# Patient Record
Sex: FEMALE | Race: WHITE
[De-identification: names, ages, dates, MRNs, and addresses within clinical notes are randomized per-mention and may not be internally consistent; named-entity substitution may affect disease eponyms.]

---

## 2017-12-27 ENCOUNTER — HOSPITAL ENCOUNTER (OUTPATIENT)
Age: 57
End: 2017-12-27
Payer: COMMERCIAL

## 2017-12-27 DIAGNOSIS — G56.01: Primary | ICD-10-CM

## 2017-12-27 PROCEDURE — 81025 URINE PREGNANCY TEST: CPT

## 2017-12-27 PROCEDURE — 96375 TX/PRO/DX INJ NEW DRUG ADDON: CPT

## 2017-12-27 PROCEDURE — 64721 CARPAL TUNNEL SURGERY: CPT

## 2018-01-22 ENCOUNTER — HOSPITAL ENCOUNTER (OUTPATIENT)
Age: 58
End: 2018-01-22
Payer: COMMERCIAL

## 2018-01-22 VITALS — BODY MASS INDEX: 39.2 KG/M2

## 2018-01-22 VITALS
HEART RATE: 95 BPM | RESPIRATION RATE: 18 BRPM | DIASTOLIC BLOOD PRESSURE: 76 MMHG | SYSTOLIC BLOOD PRESSURE: 139 MMHG | OXYGEN SATURATION: 96 % | TEMPERATURE: 97.2 F

## 2018-01-22 DIAGNOSIS — M51.16: Primary | ICD-10-CM

## 2018-01-22 PROCEDURE — 99212 OFFICE O/P EST SF 10 MIN: CPT

## 2018-01-22 PROCEDURE — 71046 X-RAY EXAM CHEST 2 VIEWS: CPT

## 2018-02-02 ENCOUNTER — HOSPITAL ENCOUNTER (OUTPATIENT)
Age: 58
End: 2018-02-02
Payer: COMMERCIAL

## 2018-02-02 DIAGNOSIS — R53.82: Primary | ICD-10-CM

## 2018-02-02 DIAGNOSIS — R59.0: ICD-10-CM

## 2018-02-02 LAB
ALBUMIN LEVEL: 3.7 GM/DL (ref 3.4–5)
ALBUMIN/GLOB SERPL: 1 {RATIO} (ref 1.1–1.8)
ALP ISO SERPL-ACNC: 112 U/L (ref 46–116)
ALT SERPLBLD-CCNC: 48 U/L (ref 12–78)
ANION GAP SERPL CALC-SCNC: 12.4 MEQ/L (ref 5–15)
AST SERPL QL: 20 U/L (ref 15–37)
BILIRUBIN,TOTAL: 0.2 MG/DL (ref 0.2–1)
BUN SERPL-MCNC: 10 MG/DL (ref 7–18)
CALCIUM SPEC-MCNC: 9.2 MG/DL (ref 8.5–10.1)
CHLORIDE SPEC-SCNC: 100 MMOL/L (ref 98–107)
CO2 SERPL-SCNC: 30 MMOL/L (ref 21–32)
CREAT BLD-SCNC: 0.6 MG/DL (ref 0.55–1.02)
ESTIMATED GLOMERULAR FILT RATE: 104 ML/MIN (ref 60–?)
GFR (AFRICAN AMERICAN): 126 ML/MIN (ref 60–?)
GLOBULIN SER CALC-MCNC: 3.7 GM/DL (ref 1.3–3.2)
GLUCOSE: 114 MG/DL (ref 74–106)
HCT VFR BLD CALC: 38.6 % (ref 37–47)
HGB BLD-MCNC: 12.3 G/DL (ref 12.2–16.2)
MCHC RBC-ENTMCNC: 31.9 G/DL (ref 31.8–35.4)
MCV RBC: 87.7 FL (ref 81–99)
MEAN CORPUSCULAR HEMOGLOBIN: 28 PG (ref 27–31.2)
PLATELET # BLD: 391 K/MM3 (ref 142–424)
POTASSIUM: 4.4 MMOL/L (ref 3.5–5.1)
PROT SERPL-MCNC: 7.4 GM/DL (ref 6.4–8.2)
RBC # BLD AUTO: 4.4 M/MM3 (ref 4.2–5.4)
SODIUM SPEC-SCNC: 138 MMOL/L (ref 136–145)
T3RU NFR SERPL: 36 % (ref 31–39)
T4 FREE SERPL-MCNC: 1.1 NG/DL (ref 0.76–1.46)
TSH SERPL-ACNC: 0.81 UIU/ML (ref 0.36–3.74)
WBC # BLD AUTO: 9.9 K/MM3 (ref 4.8–10.8)

## 2018-02-02 PROCEDURE — 85025 COMPLETE CBC W/AUTO DIFF WBC: CPT

## 2018-02-02 PROCEDURE — 71046 X-RAY EXAM CHEST 2 VIEWS: CPT

## 2018-02-02 PROCEDURE — 36415 COLL VENOUS BLD VENIPUNCTURE: CPT

## 2018-02-02 PROCEDURE — 84479 ASSAY OF THYROID (T3 OR T4): CPT

## 2018-02-02 PROCEDURE — 84439 ASSAY OF FREE THYROXINE: CPT

## 2018-02-02 PROCEDURE — 84443 ASSAY THYROID STIM HORMONE: CPT

## 2018-02-02 PROCEDURE — 80053 COMPREHEN METABOLIC PANEL: CPT

## 2018-02-12 ENCOUNTER — HOSPITAL ENCOUNTER (OUTPATIENT)
Age: 58
End: 2018-02-12
Payer: COMMERCIAL

## 2018-02-12 DIAGNOSIS — R53.82: ICD-10-CM

## 2018-02-12 DIAGNOSIS — R59.9: ICD-10-CM

## 2018-02-12 DIAGNOSIS — R59.0: Primary | ICD-10-CM

## 2018-02-12 LAB
BUN SERPL-MCNC: 12 MG/DL (ref 7–18)
CREAT BLD-SCNC: 0.57 MG/DL (ref 0.55–1.02)
ESTIMATED GLOMERULAR FILT RATE: 110 ML/MIN (ref 60–?)
GFR (AFRICAN AMERICAN): 133 ML/MIN (ref 60–?)

## 2018-02-12 PROCEDURE — 82565 ASSAY OF CREATININE: CPT

## 2018-02-12 PROCEDURE — 84520 ASSAY OF UREA NITROGEN: CPT

## 2018-02-12 PROCEDURE — 36415 COLL VENOUS BLD VENIPUNCTURE: CPT

## 2018-02-15 ENCOUNTER — HOSPITAL ENCOUNTER (OUTPATIENT)
Age: 58
End: 2018-02-15
Payer: COMMERCIAL

## 2018-02-15 VITALS — HEART RATE: 87 BPM

## 2018-02-15 DIAGNOSIS — R06.02: Primary | ICD-10-CM

## 2018-02-15 PROCEDURE — 94726 PLETHYSMOGRAPHY LUNG VOLUMES: CPT

## 2018-02-15 PROCEDURE — 94729 DIFFUSING CAPACITY: CPT

## 2018-02-15 PROCEDURE — 94640 AIRWAY INHALATION TREATMENT: CPT

## 2018-02-15 PROCEDURE — 94060 EVALUATION OF WHEEZING: CPT

## 2018-02-20 ENCOUNTER — HOSPITAL ENCOUNTER (OUTPATIENT)
Age: 58
End: 2018-02-20
Payer: COMMERCIAL

## 2018-02-20 DIAGNOSIS — R59.9: ICD-10-CM

## 2018-02-20 DIAGNOSIS — R59.0: ICD-10-CM

## 2018-02-20 DIAGNOSIS — R53.82: Primary | ICD-10-CM

## 2018-02-20 PROCEDURE — 70491 CT SOFT TISSUE NECK W/DYE: CPT

## 2018-02-23 ENCOUNTER — HOSPITAL ENCOUNTER (OUTPATIENT)
Age: 58
End: 2018-02-23
Payer: COMMERCIAL

## 2018-02-23 VITALS — HEART RATE: 82 BPM | DIASTOLIC BLOOD PRESSURE: 79 MMHG | SYSTOLIC BLOOD PRESSURE: 151 MMHG | RESPIRATION RATE: 18 BRPM

## 2018-02-23 VITALS
RESPIRATION RATE: 20 BRPM | HEART RATE: 83 BPM | DIASTOLIC BLOOD PRESSURE: 83 MMHG | OXYGEN SATURATION: 98 % | SYSTOLIC BLOOD PRESSURE: 125 MMHG | TEMPERATURE: 98.5 F

## 2018-02-23 VITALS
SYSTOLIC BLOOD PRESSURE: 150 MMHG | OXYGEN SATURATION: 99 % | DIASTOLIC BLOOD PRESSURE: 81 MMHG | HEART RATE: 81 BPM | RESPIRATION RATE: 18 BRPM

## 2018-02-23 VITALS
OXYGEN SATURATION: 96 % | RESPIRATION RATE: 20 BRPM | TEMPERATURE: 98.42 F | SYSTOLIC BLOOD PRESSURE: 127 MMHG | HEART RATE: 77 BPM | DIASTOLIC BLOOD PRESSURE: 72 MMHG

## 2018-02-23 VITALS — BODY MASS INDEX: 39.2 KG/M2

## 2018-02-23 DIAGNOSIS — M51.16: Primary | ICD-10-CM

## 2018-02-23 DIAGNOSIS — M47.896: ICD-10-CM

## 2018-02-23 PROCEDURE — 62323 NJX INTERLAMINAR LMBR/SAC: CPT

## 2018-03-01 ENCOUNTER — HOSPITAL ENCOUNTER (OUTPATIENT)
Dept: HOSPITAL 22 - LAB | Age: 58
End: 2018-03-01
Payer: COMMERCIAL

## 2018-03-01 DIAGNOSIS — Z79.899: Primary | ICD-10-CM

## 2018-03-01 PROCEDURE — 80305 DRUG TEST PRSMV DIR OPT OBS: CPT

## 2018-03-13 ENCOUNTER — HOSPITAL ENCOUNTER (OUTPATIENT)
Age: 58
End: 2018-03-13
Payer: COMMERCIAL

## 2018-03-13 VITALS
OXYGEN SATURATION: 98 % | SYSTOLIC BLOOD PRESSURE: 115 MMHG | RESPIRATION RATE: 22 BRPM | DIASTOLIC BLOOD PRESSURE: 65 MMHG | HEART RATE: 101 BPM

## 2018-03-13 VITALS — BODY MASS INDEX: 39.6 KG/M2

## 2018-03-13 DIAGNOSIS — M47.816: ICD-10-CM

## 2018-03-13 DIAGNOSIS — M48.061: Primary | ICD-10-CM

## 2018-03-13 PROCEDURE — 99212 OFFICE O/P EST SF 10 MIN: CPT

## 2018-03-27 ENCOUNTER — HOSPITAL ENCOUNTER (OUTPATIENT)
Age: 58
End: 2018-03-27
Payer: COMMERCIAL

## 2018-03-27 DIAGNOSIS — R59.9: Primary | ICD-10-CM

## 2018-03-27 LAB
BUN SERPL-MCNC: 9 MG/DL (ref 7–18)
CREAT BLD-SCNC: 0.63 MG/DL (ref 0.55–1.02)
ESTIMATED GLOMERULAR FILT RATE: 98 ML/MIN (ref 60–?)
GFR (AFRICAN AMERICAN): 119 ML/MIN (ref 60–?)

## 2018-03-27 PROCEDURE — 36415 COLL VENOUS BLD VENIPUNCTURE: CPT

## 2018-03-27 PROCEDURE — 82565 ASSAY OF CREATININE: CPT

## 2018-03-27 PROCEDURE — 84520 ASSAY OF UREA NITROGEN: CPT

## 2018-03-29 ENCOUNTER — HOSPITAL ENCOUNTER (OUTPATIENT)
Age: 58
End: 2018-03-29
Payer: COMMERCIAL

## 2018-03-29 DIAGNOSIS — R59.0: Primary | ICD-10-CM

## 2018-03-29 DIAGNOSIS — L03.323: ICD-10-CM

## 2018-03-29 PROCEDURE — 71260 CT THORAX DX C+: CPT

## 2018-04-16 ENCOUNTER — HOSPITAL ENCOUNTER (OUTPATIENT)
Age: 58
End: 2018-04-16
Payer: COMMERCIAL

## 2018-04-16 VITALS — DIASTOLIC BLOOD PRESSURE: 66 MMHG | RESPIRATION RATE: 18 BRPM | HEART RATE: 76 BPM | SYSTOLIC BLOOD PRESSURE: 142 MMHG

## 2018-04-16 VITALS — BODY MASS INDEX: 39.2 KG/M2

## 2018-04-16 DIAGNOSIS — M47.816: Primary | ICD-10-CM

## 2018-04-16 PROCEDURE — 99212 OFFICE O/P EST SF 10 MIN: CPT

## 2018-05-07 ENCOUNTER — HOSPITAL ENCOUNTER (OUTPATIENT)
Age: 58
End: 2018-05-07
Payer: COMMERCIAL

## 2018-05-07 VITALS
HEART RATE: 74 BPM | TEMPERATURE: 98.06 F | RESPIRATION RATE: 18 BRPM | OXYGEN SATURATION: 99 % | SYSTOLIC BLOOD PRESSURE: 136 MMHG | DIASTOLIC BLOOD PRESSURE: 39 MMHG

## 2018-05-07 VITALS — BODY MASS INDEX: 39.2 KG/M2

## 2018-05-07 DIAGNOSIS — M79.1: ICD-10-CM

## 2018-05-07 DIAGNOSIS — M48.00: Primary | ICD-10-CM

## 2018-05-07 PROCEDURE — 99212 OFFICE O/P EST SF 10 MIN: CPT

## 2018-05-08 ENCOUNTER — HOSPITAL ENCOUNTER (OUTPATIENT)
Dept: HOSPITAL 22 - LAB | Age: 58
End: 2018-05-08
Payer: COMMERCIAL

## 2018-05-08 DIAGNOSIS — R53.83: Primary | ICD-10-CM

## 2018-05-08 DIAGNOSIS — R51: ICD-10-CM

## 2018-05-08 LAB
ALBUMIN LEVEL: 3.8 GM/DL (ref 3.4–5)
ALBUMIN/GLOB SERPL: 1 {RATIO} (ref 1.1–1.8)
ALP ISO SERPL-ACNC: 113 U/L (ref 46–116)
ALT SERPLBLD-CCNC: 70 U/L (ref 12–78)
ANION GAP SERPL CALC-SCNC: 14.6 MEQ/L (ref 5–15)
AST SERPL QL: 49 U/L (ref 15–37)
BILIRUBIN,TOTAL: 0.2 MG/DL (ref 0.2–1)
BUN SERPL-MCNC: 14 MG/DL (ref 7–18)
CALCIUM SPEC-MCNC: 9.6 MG/DL (ref 8.5–10.1)
CHLORIDE SPEC-SCNC: 104 MMOL/L (ref 98–107)
CHOLEST SPEC-SCNC: 202 MG/DL (ref 140–200)
CO2 SERPL-SCNC: 27 MMOL/L (ref 21–32)
CREAT BLD-SCNC: 0.7 MG/DL (ref 0.55–1.02)
ESTIMATED GLOMERULAR FILT RATE: 87 ML/MIN (ref 60–?)
GFR (AFRICAN AMERICAN): 105 ML/MIN (ref 60–?)
GLOBULIN SER CALC-MCNC: 3.8 GM/DL (ref 1.3–3.2)
GLUCOSE: 88 MG/DL (ref 74–106)
HBA1C MFR BLD: 6 % (ref 0–7)
HCT VFR BLD CALC: 41 % (ref 37–47)
HDLC SERPL-MCNC: 59 MG/DL (ref 29–89)
HGB BLD-MCNC: 13.3 G/DL (ref 12.2–16.2)
MCHC RBC-ENTMCNC: 32.4 G/DL (ref 31.8–35.4)
MCV RBC: 89.4 FL (ref 81–99)
MEAN CORPUSCULAR HEMOGLOBIN: 29 PG (ref 27–31.2)
PLATELET # BLD: 390 K/MM3 (ref 142–424)
POTASSIUM: 4.6 MMOL/L (ref 3.5–5.1)
PROT SERPL-MCNC: 7.6 GM/DL (ref 6.4–8.2)
RBC # BLD AUTO: 4.59 M/MM3 (ref 4.2–5.4)
SODIUM SPEC-SCNC: 141 MMOL/L (ref 136–145)
T4 FREE SERPL-MCNC: 1.14 NG/DL (ref 0.76–1.46)
TRIGLYCERIDES: 269 MG/DL (ref 30–200)
TSH SERPL-ACNC: 1.16 UIU/ML (ref 0.36–3.74)
WBC # BLD AUTO: 8.3 K/MM3 (ref 4.8–10.8)

## 2018-05-08 PROCEDURE — 84439 ASSAY OF FREE THYROXINE: CPT

## 2018-05-08 PROCEDURE — 80061 LIPID PANEL: CPT

## 2018-05-08 PROCEDURE — 85025 COMPLETE CBC W/AUTO DIFF WBC: CPT

## 2018-05-08 PROCEDURE — 82652 VIT D 1 25-DIHYDROXY: CPT

## 2018-05-08 PROCEDURE — 80053 COMPREHEN METABOLIC PANEL: CPT

## 2018-05-08 PROCEDURE — 84443 ASSAY THYROID STIM HORMONE: CPT

## 2018-05-08 PROCEDURE — 83036 HEMOGLOBIN GLYCOSYLATED A1C: CPT

## 2018-06-15 ENCOUNTER — HOSPITAL ENCOUNTER (OUTPATIENT)
Age: 58
End: 2018-06-15
Payer: COMMERCIAL

## 2018-06-15 DIAGNOSIS — N89.8: Primary | ICD-10-CM

## 2018-06-15 PROCEDURE — 87086 URINE CULTURE/COLONY COUNT: CPT

## 2018-06-25 ENCOUNTER — HOSPITAL ENCOUNTER (OUTPATIENT)
Age: 58
End: 2018-06-25
Payer: COMMERCIAL

## 2018-06-25 VITALS
RESPIRATION RATE: 18 BRPM | OXYGEN SATURATION: 94 % | SYSTOLIC BLOOD PRESSURE: 128 MMHG | TEMPERATURE: 97.8 F | HEART RATE: 91 BPM | DIASTOLIC BLOOD PRESSURE: 79 MMHG

## 2018-06-25 VITALS — BODY MASS INDEX: 38.2 KG/M2

## 2018-06-25 DIAGNOSIS — M54.2: ICD-10-CM

## 2018-06-25 DIAGNOSIS — M79.1: Primary | ICD-10-CM

## 2018-06-25 PROCEDURE — 99212 OFFICE O/P EST SF 10 MIN: CPT

## 2018-07-06 ENCOUNTER — HOSPITAL ENCOUNTER (OUTPATIENT)
Dept: HOSPITAL 22 - PT | Age: 58
Discharge: HOME | End: 2018-07-06
Payer: COMMERCIAL

## 2018-07-06 DIAGNOSIS — M54.2: Primary | ICD-10-CM

## 2018-07-06 PROCEDURE — 97163 PT EVAL HIGH COMPLEX 45 MIN: CPT

## 2018-07-06 PROCEDURE — G0283 ELEC STIM OTHER THAN WOUND: HCPCS

## 2018-07-06 PROCEDURE — 97110 THERAPEUTIC EXERCISES: CPT

## 2018-07-06 PROCEDURE — 97035 APP MDLTY 1+ULTRASOUND EA 15: CPT

## 2018-07-06 PROCEDURE — 97010 HOT OR COLD PACKS THERAPY: CPT

## 2018-07-06 PROCEDURE — 97014 ELECTRIC STIMULATION THERAPY: CPT

## 2018-07-06 PROCEDURE — 97140 MANUAL THERAPY 1/> REGIONS: CPT

## 2018-07-09 ENCOUNTER — HOSPITAL ENCOUNTER (OUTPATIENT)
Age: 58
End: 2018-07-09
Payer: COMMERCIAL

## 2018-07-09 VITALS — HEART RATE: 85 BPM

## 2018-07-09 VITALS
SYSTOLIC BLOOD PRESSURE: 145 MMHG | DIASTOLIC BLOOD PRESSURE: 80 MMHG | HEART RATE: 108 BPM | OXYGEN SATURATION: 96 % | RESPIRATION RATE: 18 BRPM

## 2018-07-09 DIAGNOSIS — F17.200: ICD-10-CM

## 2018-07-09 DIAGNOSIS — R93.8: Primary | ICD-10-CM

## 2018-07-09 DIAGNOSIS — Z87.891: ICD-10-CM

## 2018-07-09 PROCEDURE — 94640 AIRWAY INHALATION TREATMENT: CPT

## 2018-07-09 PROCEDURE — 94726 PLETHYSMOGRAPHY LUNG VOLUMES: CPT

## 2018-07-09 PROCEDURE — 71250 CT THORAX DX C-: CPT

## 2018-07-09 PROCEDURE — 94729 DIFFUSING CAPACITY: CPT

## 2018-07-09 PROCEDURE — 94060 EVALUATION OF WHEEZING: CPT

## 2018-07-09 PROCEDURE — 94618 PULMONARY STRESS TESTING: CPT

## 2018-08-27 ENCOUNTER — HOSPITAL ENCOUNTER (OUTPATIENT)
Age: 58
End: 2018-08-27
Payer: COMMERCIAL

## 2018-08-27 VITALS
OXYGEN SATURATION: 97 % | RESPIRATION RATE: 18 BRPM | HEART RATE: 87 BPM | DIASTOLIC BLOOD PRESSURE: 65 MMHG | SYSTOLIC BLOOD PRESSURE: 114 MMHG

## 2018-08-27 VITALS — BODY MASS INDEX: 36.8 KG/M2

## 2018-08-27 DIAGNOSIS — M51.16: ICD-10-CM

## 2018-08-27 DIAGNOSIS — M54.2: Primary | ICD-10-CM

## 2018-08-27 DIAGNOSIS — M79.1: ICD-10-CM

## 2018-08-27 PROCEDURE — 99213 OFFICE O/P EST LOW 20 MIN: CPT

## 2018-08-27 PROCEDURE — 99212 OFFICE O/P EST SF 10 MIN: CPT

## 2018-10-31 ENCOUNTER — HOSPITAL ENCOUNTER (OUTPATIENT)
Age: 58
End: 2018-10-31
Payer: COMMERCIAL

## 2018-10-31 DIAGNOSIS — I10: Primary | ICD-10-CM

## 2018-10-31 LAB
ALBUMIN LEVEL: 3.9 GM/DL (ref 3.4–5)
ALBUMIN/GLOB SERPL: 1 {RATIO} (ref 1.1–1.8)
ALP ISO SERPL-ACNC: 133 U/L (ref 46–116)
ALT SERPLBLD-CCNC: 43 U/L (ref 12–78)
ANION GAP SERPL CALC-SCNC: 13.6 MEQ/L (ref 5–15)
AST SERPL QL: 26 U/L (ref 15–37)
BILIRUBIN,TOTAL: 0.3 MG/DL (ref 0.2–1)
BUN SERPL-MCNC: 10 MG/DL (ref 7–18)
CALCIUM SPEC-MCNC: 9.8 MG/DL (ref 8.5–10.1)
CHLORIDE SPEC-SCNC: 102 MMOL/L (ref 98–107)
CO2 SERPL-SCNC: 29 MMOL/L (ref 21–32)
CREAT BLD-SCNC: 0.69 MG/DL (ref 0.55–1.02)
ESTIMATED GLOMERULAR FILT RATE: 88 ML/MIN (ref 60–?)
GFR (AFRICAN AMERICAN): 106 ML/MIN (ref 60–?)
GLOBULIN SER CALC-MCNC: 4 GM/DL (ref 1.3–3.2)
GLUCOSE: 85 MG/DL (ref 74–106)
HCT VFR BLD CALC: 38.1 % (ref 37–47)
HGB BLD-MCNC: 12.6 G/DL (ref 12.2–16.2)
LIPASE: 122 U/L (ref 73–393)
MCHC RBC-ENTMCNC: 33 G/DL (ref 31.8–35.4)
MCV RBC: 86.5 FL (ref 81–99)
MEAN CORPUSCULAR HEMOGLOBIN: 28.5 PG (ref 27–31.2)
PLATELET # BLD: 394 K/MM3 (ref 142–424)
POTASSIUM: 4.6 MMOL/L (ref 3.5–5.1)
PROT SERPL-MCNC: 7.9 GM/DL (ref 6.4–8.2)
RBC # BLD AUTO: 4.4 M/MM3 (ref 4.2–5.4)
SODIUM SPEC-SCNC: 140 MMOL/L (ref 136–145)
WBC # BLD AUTO: 12.3 K/MM3 (ref 4.8–10.8)

## 2018-10-31 PROCEDURE — 83690 ASSAY OF LIPASE: CPT

## 2018-10-31 PROCEDURE — 85025 COMPLETE CBC W/AUTO DIFF WBC: CPT

## 2018-10-31 PROCEDURE — 80053 COMPREHEN METABOLIC PANEL: CPT

## 2018-11-12 ENCOUNTER — HOSPITAL ENCOUNTER (OUTPATIENT)
Age: 58
End: 2018-11-12
Payer: COMMERCIAL

## 2018-11-12 DIAGNOSIS — R10.9: Primary | ICD-10-CM

## 2018-11-12 PROCEDURE — 76705 ECHO EXAM OF ABDOMEN: CPT

## 2018-11-19 ENCOUNTER — HOSPITAL ENCOUNTER (OUTPATIENT)
Age: 58
End: 2018-11-19
Payer: COMMERCIAL

## 2018-11-19 VITALS
OXYGEN SATURATION: 98 % | HEART RATE: 78 BPM | SYSTOLIC BLOOD PRESSURE: 124 MMHG | DIASTOLIC BLOOD PRESSURE: 69 MMHG | RESPIRATION RATE: 18 BRPM

## 2018-11-19 VITALS — BODY MASS INDEX: 37.9 KG/M2

## 2018-11-19 DIAGNOSIS — M51.16: Primary | ICD-10-CM

## 2018-11-19 PROCEDURE — 99213 OFFICE O/P EST LOW 20 MIN: CPT

## 2018-12-26 ENCOUNTER — HOSPITAL ENCOUNTER (OUTPATIENT)
Age: 58
End: 2018-12-26
Payer: COMMERCIAL

## 2018-12-26 DIAGNOSIS — Z01.818: Primary | ICD-10-CM

## 2018-12-26 DIAGNOSIS — K82.8: ICD-10-CM

## 2018-12-26 LAB
ALBUMIN LEVEL: 3.2 GM/DL (ref 3.4–5)
ALBUMIN/GLOB SERPL: 0.9 {RATIO} (ref 1.1–1.8)
ALP ISO SERPL-ACNC: 97 U/L (ref 46–116)
ALT SERPLBLD-CCNC: 39 U/L (ref 12–78)
ANION GAP SERPL CALC-SCNC: 12.2 MEQ/L (ref 5–15)
AST SERPL QL: 23 U/L (ref 15–37)
BILIRUBIN,TOTAL: 0.3 MG/DL (ref 0.2–1)
BUN SERPL-MCNC: 13 MG/DL (ref 7–18)
CALCIUM SPEC-MCNC: 8.7 MG/DL (ref 8.5–10.1)
CHLORIDE SPEC-SCNC: 102 MMOL/L (ref 98–107)
CO2 SERPL-SCNC: 29 MMOL/L (ref 21–32)
CREAT BLD-SCNC: 0.64 MG/DL (ref 0.55–1.02)
ESTIMATED GLOMERULAR FILT RATE: 96 ML/MIN (ref 60–?)
GFR (AFRICAN AMERICAN): 116 ML/MIN (ref 60–?)
GLOBULIN SER CALC-MCNC: 3.4 GM/DL (ref 1.3–3.2)
GLUCOSE: 129 MG/DL (ref 74–106)
HCT VFR BLD CALC: 36.3 % (ref 37–47)
HGB BLD-MCNC: 11.9 G/DL (ref 12.2–16.2)
MCHC RBC-ENTMCNC: 32.9 G/DL (ref 31.8–35.4)
MCV RBC: 86.9 FL (ref 81–99)
MEAN CORPUSCULAR HEMOGLOBIN: 28.6 PG (ref 27–31.2)
PLATELET # BLD: 338 K/MM3 (ref 142–424)
POTASSIUM: 4.2 MMOL/L (ref 3.5–5.1)
PROT SERPL-MCNC: 6.6 GM/DL (ref 6.4–8.2)
RBC # BLD AUTO: 4.18 M/MM3 (ref 4.2–5.4)
SODIUM SPEC-SCNC: 139 MMOL/L (ref 136–145)
WBC # BLD AUTO: 7.6 K/MM3 (ref 4.8–10.8)

## 2018-12-26 PROCEDURE — 85025 COMPLETE CBC W/AUTO DIFF WBC: CPT

## 2018-12-26 PROCEDURE — 80053 COMPREHEN METABOLIC PANEL: CPT

## 2018-12-26 PROCEDURE — 36415 COLL VENOUS BLD VENIPUNCTURE: CPT

## 2018-12-26 PROCEDURE — 93005 ELECTROCARDIOGRAM TRACING: CPT

## 2018-12-26 PROCEDURE — 81025 URINE PREGNANCY TEST: CPT

## 2019-01-24 ENCOUNTER — HOSPITAL ENCOUNTER (OUTPATIENT)
Age: 59
End: 2019-01-24
Payer: COMMERCIAL

## 2019-01-24 DIAGNOSIS — J32.9: Primary | ICD-10-CM

## 2019-01-24 PROCEDURE — 70486 CT MAXILLOFACIAL W/O DYE: CPT

## 2019-01-29 ENCOUNTER — HOSPITAL ENCOUNTER (OUTPATIENT)
Age: 59
End: 2019-01-29
Payer: COMMERCIAL

## 2019-01-29 DIAGNOSIS — R30.9: Primary | ICD-10-CM

## 2019-01-29 PROCEDURE — 87086 URINE CULTURE/COLONY COUNT: CPT

## 2019-02-12 ENCOUNTER — HOSPITAL ENCOUNTER (OUTPATIENT)
Age: 59
End: 2019-02-12
Payer: COMMERCIAL

## 2019-02-12 VITALS
DIASTOLIC BLOOD PRESSURE: 60 MMHG | OXYGEN SATURATION: 98 % | RESPIRATION RATE: 18 BRPM | HEART RATE: 89 BPM | SYSTOLIC BLOOD PRESSURE: 142 MMHG

## 2019-02-12 VITALS — BODY MASS INDEX: 39.6 KG/M2

## 2019-02-12 DIAGNOSIS — M48.062: Primary | ICD-10-CM

## 2019-02-12 PROCEDURE — 99213 OFFICE O/P EST LOW 20 MIN: CPT

## 2019-02-15 ENCOUNTER — HOSPITAL ENCOUNTER (OUTPATIENT)
Age: 59
End: 2019-02-15
Payer: COMMERCIAL

## 2019-02-15 DIAGNOSIS — M54.5: Primary | ICD-10-CM

## 2019-02-15 PROCEDURE — 72120 X-RAY BEND ONLY L-S SPINE: CPT

## 2019-02-15 PROCEDURE — 72020 X-RAY EXAM OF SPINE 1 VIEW: CPT

## 2019-02-18 ENCOUNTER — HOSPITAL ENCOUNTER (OUTPATIENT)
Age: 59
End: 2019-02-18
Payer: COMMERCIAL

## 2019-02-18 DIAGNOSIS — R09.89: ICD-10-CM

## 2019-02-18 DIAGNOSIS — R05: Primary | ICD-10-CM

## 2019-02-18 PROCEDURE — 71046 X-RAY EXAM CHEST 2 VIEWS: CPT

## 2019-03-05 ENCOUNTER — HOSPITAL ENCOUNTER (OUTPATIENT)
Age: 59
End: 2019-03-05
Payer: COMMERCIAL

## 2019-03-05 DIAGNOSIS — R09.89: Primary | ICD-10-CM

## 2019-03-05 DIAGNOSIS — R05: ICD-10-CM

## 2019-03-05 PROCEDURE — 71046 X-RAY EXAM CHEST 2 VIEWS: CPT

## 2019-03-25 ENCOUNTER — HOSPITAL ENCOUNTER (OUTPATIENT)
Age: 59
End: 2019-03-25
Payer: COMMERCIAL

## 2019-03-25 DIAGNOSIS — Z12.31: Primary | ICD-10-CM

## 2019-03-25 PROCEDURE — 77067 SCR MAMMO BI INCL CAD: CPT

## 2019-05-10 ENCOUNTER — HOSPITAL ENCOUNTER (OUTPATIENT)
Age: 59
End: 2019-05-10
Payer: COMMERCIAL

## 2019-05-10 VITALS
SYSTOLIC BLOOD PRESSURE: 128 MMHG | HEART RATE: 78 BPM | OXYGEN SATURATION: 99 % | DIASTOLIC BLOOD PRESSURE: 72 MMHG | RESPIRATION RATE: 18 BRPM

## 2019-05-10 VITALS — BODY MASS INDEX: 41.1 KG/M2

## 2019-05-10 DIAGNOSIS — M48.062: Primary | ICD-10-CM

## 2019-05-10 PROCEDURE — 99212 OFFICE O/P EST SF 10 MIN: CPT

## 2019-05-20 ENCOUNTER — HOSPITAL ENCOUNTER (OUTPATIENT)
Age: 59
End: 2019-05-20
Payer: COMMERCIAL

## 2019-05-20 VITALS
SYSTOLIC BLOOD PRESSURE: 139 MMHG | DIASTOLIC BLOOD PRESSURE: 75 MMHG | OXYGEN SATURATION: 98 % | HEART RATE: 71 BPM | RESPIRATION RATE: 18 BRPM

## 2019-05-20 VITALS — BODY MASS INDEX: 41.1 KG/M2

## 2019-05-20 DIAGNOSIS — M48.062: Primary | ICD-10-CM

## 2019-05-20 PROCEDURE — 99212 OFFICE O/P EST SF 10 MIN: CPT

## 2019-05-28 ENCOUNTER — HOSPITAL ENCOUNTER (OUTPATIENT)
Age: 59
End: 2019-05-28
Payer: COMMERCIAL

## 2019-05-28 VITALS
HEART RATE: 75 BPM | OXYGEN SATURATION: 98 % | RESPIRATION RATE: 18 BRPM | SYSTOLIC BLOOD PRESSURE: 145 MMHG | DIASTOLIC BLOOD PRESSURE: 78 MMHG

## 2019-05-28 VITALS — BODY MASS INDEX: 39.9 KG/M2

## 2019-05-28 DIAGNOSIS — M48.062: Primary | ICD-10-CM

## 2019-05-28 PROCEDURE — 72100 X-RAY EXAM L-S SPINE 2/3 VWS: CPT

## 2019-05-28 PROCEDURE — 99212 OFFICE O/P EST SF 10 MIN: CPT

## 2019-06-24 ENCOUNTER — HOSPITAL ENCOUNTER (OUTPATIENT)
Age: 59
End: 2019-06-24
Payer: COMMERCIAL

## 2019-06-24 VITALS
DIASTOLIC BLOOD PRESSURE: 67 MMHG | OXYGEN SATURATION: 98 % | RESPIRATION RATE: 18 BRPM | SYSTOLIC BLOOD PRESSURE: 143 MMHG | HEART RATE: 76 BPM

## 2019-06-24 VITALS — BODY MASS INDEX: 41.1 KG/M2

## 2019-06-24 DIAGNOSIS — M48.061: ICD-10-CM

## 2019-06-24 DIAGNOSIS — M51.36: Primary | ICD-10-CM

## 2019-06-24 PROCEDURE — 99212 OFFICE O/P EST SF 10 MIN: CPT

## 2019-06-25 ENCOUNTER — HOSPITAL ENCOUNTER (OUTPATIENT)
Age: 59
End: 2019-06-25
Payer: COMMERCIAL

## 2019-06-25 DIAGNOSIS — R07.89: Primary | ICD-10-CM

## 2019-06-25 PROCEDURE — 78452 HT MUSCLE IMAGE SPECT MULT: CPT

## 2019-06-25 PROCEDURE — 93017 CV STRESS TEST TRACING ONLY: CPT

## 2019-07-30 ENCOUNTER — HOSPITAL ENCOUNTER (OUTPATIENT)
Age: 59
End: 2019-07-30
Payer: COMMERCIAL

## 2019-07-30 VITALS
OXYGEN SATURATION: 98 % | DIASTOLIC BLOOD PRESSURE: 62 MMHG | SYSTOLIC BLOOD PRESSURE: 116 MMHG | RESPIRATION RATE: 18 BRPM | HEART RATE: 72 BPM

## 2019-07-30 VITALS — BODY MASS INDEX: 39.4 KG/M2

## 2019-07-30 DIAGNOSIS — M51.36: Primary | ICD-10-CM

## 2019-07-30 DIAGNOSIS — M48.061: ICD-10-CM

## 2019-07-30 PROCEDURE — 99212 OFFICE O/P EST SF 10 MIN: CPT

## 2019-08-05 ENCOUNTER — HOSPITAL ENCOUNTER (OUTPATIENT)
Age: 59
End: 2019-08-05
Payer: COMMERCIAL

## 2019-08-05 DIAGNOSIS — N20.9: Primary | ICD-10-CM

## 2019-08-05 PROCEDURE — 74176 CT ABD & PELVIS W/O CONTRAST: CPT

## 2019-08-13 ENCOUNTER — HOSPITAL ENCOUNTER (OUTPATIENT)
Age: 59
End: 2019-08-13
Payer: COMMERCIAL

## 2019-08-13 DIAGNOSIS — I10: ICD-10-CM

## 2019-08-13 DIAGNOSIS — I25.10: ICD-10-CM

## 2019-08-13 DIAGNOSIS — E66.01: Primary | ICD-10-CM

## 2019-08-13 LAB
ALBUMIN LEVEL: 3.4 GM/DL (ref 3.4–5)
ALP ISO SERPL-ACNC: 135 U/L (ref 46–116)
ALT SERPLBLD-CCNC: 35 U/L (ref 12–78)
AST SERPL QL: 18 U/L (ref 15–37)
BILIRUB DIRECT SERPL-MCNC: 0.1 MG/DL (ref 0–0.2)
BILIRUB INDIRECT SERPL-MCNC: 0.2 MG/DL (ref 0–0.9)
BILIRUBIN,TOTAL: 0.3 MG/DL (ref 0.2–1)
CHOLEST SPEC-SCNC: 129 MG/DL (ref 140–200)
HDLC SERPL-MCNC: 49 MG/DL (ref 29–89)
PROT SERPL-MCNC: 7 GM/DL (ref 6.4–8.2)
TRIGLYCERIDES: 169 MG/DL (ref 30–200)

## 2019-08-13 PROCEDURE — 80076 HEPATIC FUNCTION PANEL: CPT

## 2019-08-13 PROCEDURE — 36415 COLL VENOUS BLD VENIPUNCTURE: CPT

## 2019-08-13 PROCEDURE — 80061 LIPID PANEL: CPT

## 2019-10-28 ENCOUNTER — HOSPITAL ENCOUNTER (OUTPATIENT)
Age: 59
End: 2019-10-28
Payer: COMMERCIAL

## 2019-10-28 VITALS
DIASTOLIC BLOOD PRESSURE: 59 MMHG | SYSTOLIC BLOOD PRESSURE: 118 MMHG | HEART RATE: 70 BPM | OXYGEN SATURATION: 98 % | RESPIRATION RATE: 18 BRPM

## 2019-10-28 VITALS
HEART RATE: 58 BPM | DIASTOLIC BLOOD PRESSURE: 54 MMHG | SYSTOLIC BLOOD PRESSURE: 147 MMHG | OXYGEN SATURATION: 98 % | RESPIRATION RATE: 18 BRPM

## 2019-10-28 VITALS — BODY MASS INDEX: 29.9 KG/M2 | BODY MASS INDEX: 39.4 KG/M2

## 2019-10-28 DIAGNOSIS — M48.062: Primary | ICD-10-CM

## 2019-10-28 PROCEDURE — 99212 OFFICE O/P EST SF 10 MIN: CPT

## 2019-11-11 ENCOUNTER — HOSPITAL ENCOUNTER (OUTPATIENT)
Age: 59
End: 2019-11-11
Payer: COMMERCIAL

## 2019-11-11 DIAGNOSIS — N20.0: Primary | ICD-10-CM

## 2019-11-11 PROCEDURE — 74018 RADEX ABDOMEN 1 VIEW: CPT

## 2020-02-17 ENCOUNTER — HOSPITAL ENCOUNTER (OUTPATIENT)
Age: 60
End: 2020-02-17
Payer: COMMERCIAL

## 2020-02-17 DIAGNOSIS — N20.0: Primary | ICD-10-CM

## 2020-02-17 PROCEDURE — 74018 RADEX ABDOMEN 1 VIEW: CPT

## 2020-02-25 ENCOUNTER — HOSPITAL ENCOUNTER (OUTPATIENT)
Age: 60
End: 2020-02-25
Payer: COMMERCIAL

## 2020-02-25 DIAGNOSIS — R10.9: Primary | ICD-10-CM

## 2020-02-25 DIAGNOSIS — R55: ICD-10-CM

## 2020-02-25 DIAGNOSIS — E55.9: ICD-10-CM

## 2020-02-25 DIAGNOSIS — R11.2: ICD-10-CM

## 2020-02-25 DIAGNOSIS — Z79.899: ICD-10-CM

## 2020-02-25 LAB
ALBUMIN LEVEL: 4.4 G/DL (ref 3.5–5)
ALBUMIN/GLOB SERPL: 1.5 {RATIO} (ref 1.1–1.8)
ALP ISO SERPL-ACNC: 151 U/L (ref 38–126)
ALT SERPLBLD-CCNC: 32 U/L (ref 12–78)
ANION GAP SERPL CALC-SCNC: 12.4 MEQ/L (ref 5–15)
AST SERPL QL: 32 U/L (ref 14–36)
BILIRUBIN,TOTAL: 0.3 MG/DL (ref 0.2–1.3)
BUN SERPL-MCNC: 10 MG/DL (ref 7–17)
CALCIUM SPEC-MCNC: 9.8 MG/DL (ref 8.4–10.2)
CHLORIDE SPEC-SCNC: 101 MMOL/L (ref 98–107)
CHOLEST SPEC-SCNC: 146 MG/DL (ref 140–200)
CO2 SERPL-SCNC: 28 MMOL/L (ref 22–30)
CREAT BLD-SCNC: 0.5 MG/DL (ref 0.52–1.04)
ESTIMATED GLOMERULAR FILT RATE: 127 ML/MIN (ref 60–?)
GFR (AFRICAN AMERICAN): 154 ML/MIN (ref 60–?)
GLOBULIN SER CALC-MCNC: 3 G/DL (ref 1.3–3.2)
GLUCOSE: 122 MG/DL (ref 74–100)
HCT VFR BLD CALC: 37.8 % (ref 37–47)
HDLC SERPL-MCNC: 56 MG/DL (ref 40–60)
HGB BLD-MCNC: 12.4 G/DL (ref 12.2–16.2)
MCHC RBC-ENTMCNC: 32.9 G/DL (ref 31.8–35.4)
MCV RBC: 86.5 FL (ref 81–99)
MEAN CORPUSCULAR HEMOGLOBIN: 28.5 PG (ref 27–31.2)
PLATELET # BLD: 394 K/MM3 (ref 142–424)
POTASSIUM: 4.4 MMOL/L (ref 3.5–5.1)
PROT SERPL-MCNC: 7.4 G/DL (ref 6.3–8.2)
RBC # BLD AUTO: 4.37 M/MM3 (ref 4.2–5.4)
SODIUM SPEC-SCNC: 137 MMOL/L (ref 136–145)
T4 (THYROXINE): 7.2 UG/DL (ref 5.53–11)
TRIGLYCERIDES: 165 MG/DL (ref 30–150)
TSH SERPL-ACNC: 1.38 UIU/ML (ref 0.47–4.68)
WBC # BLD AUTO: 8.6 K/MM3 (ref 4.8–10.8)

## 2020-02-25 PROCEDURE — 84436 ASSAY OF TOTAL THYROXINE: CPT

## 2020-02-25 PROCEDURE — 80061 LIPID PANEL: CPT

## 2020-02-25 PROCEDURE — 84443 ASSAY THYROID STIM HORMONE: CPT

## 2020-02-25 PROCEDURE — 80053 COMPREHEN METABOLIC PANEL: CPT

## 2020-02-25 PROCEDURE — 85025 COMPLETE CBC W/AUTO DIFF WBC: CPT

## 2020-02-25 PROCEDURE — 82652 VIT D 1 25-DIHYDROXY: CPT

## 2020-04-27 ENCOUNTER — HOSPITAL ENCOUNTER (OUTPATIENT)
Age: 60
End: 2020-04-27
Payer: COMMERCIAL

## 2020-04-27 DIAGNOSIS — M62.838: Primary | ICD-10-CM

## 2020-04-27 DIAGNOSIS — M48.062: ICD-10-CM

## 2020-04-27 PROCEDURE — 99212 OFFICE O/P EST SF 10 MIN: CPT

## 2020-05-18 ENCOUNTER — HOSPITAL ENCOUNTER (OUTPATIENT)
Age: 60
End: 2020-05-18
Payer: COMMERCIAL

## 2020-05-18 VITALS
DIASTOLIC BLOOD PRESSURE: 58 MMHG | OXYGEN SATURATION: 99 % | TEMPERATURE: 97.88 F | SYSTOLIC BLOOD PRESSURE: 125 MMHG | RESPIRATION RATE: 18 BRPM | HEART RATE: 85 BPM

## 2020-05-18 VITALS — BODY MASS INDEX: 36.6 KG/M2

## 2020-05-18 DIAGNOSIS — M48.062: ICD-10-CM

## 2020-05-18 DIAGNOSIS — M51.16: Primary | ICD-10-CM

## 2020-05-18 PROCEDURE — 99212 OFFICE O/P EST SF 10 MIN: CPT

## 2020-05-27 ENCOUNTER — HOSPITAL ENCOUNTER (OUTPATIENT)
Age: 60
End: 2020-05-27
Payer: COMMERCIAL

## 2020-05-27 DIAGNOSIS — M54.5: Primary | ICD-10-CM

## 2020-05-27 PROCEDURE — 72148 MRI LUMBAR SPINE W/O DYE: CPT

## 2020-05-27 PROCEDURE — 76376 3D RENDER W/INTRP POSTPROCES: CPT

## 2020-06-02 ENCOUNTER — HOSPITAL ENCOUNTER (OUTPATIENT)
Age: 60
End: 2020-06-02
Payer: COMMERCIAL

## 2020-06-02 VITALS
OXYGEN SATURATION: 99 % | SYSTOLIC BLOOD PRESSURE: 118 MMHG | RESPIRATION RATE: 18 BRPM | HEART RATE: 75 BPM | DIASTOLIC BLOOD PRESSURE: 55 MMHG | TEMPERATURE: 97.5 F

## 2020-06-02 VITALS — BODY MASS INDEX: 38.2 KG/M2

## 2020-06-02 DIAGNOSIS — M51.16: Primary | ICD-10-CM

## 2020-06-02 PROCEDURE — 99212 OFFICE O/P EST SF 10 MIN: CPT

## 2020-06-22 ENCOUNTER — HOSPITAL ENCOUNTER (OUTPATIENT)
Age: 60
End: 2020-06-22
Payer: COMMERCIAL

## 2020-06-22 VITALS
OXYGEN SATURATION: 99 % | DIASTOLIC BLOOD PRESSURE: 80 MMHG | SYSTOLIC BLOOD PRESSURE: 132 MMHG | HEART RATE: 90 BPM | TEMPERATURE: 98.24 F | RESPIRATION RATE: 18 BRPM

## 2020-06-22 VITALS — BODY MASS INDEX: 36.6 KG/M2

## 2020-06-22 DIAGNOSIS — M48.062: Primary | ICD-10-CM

## 2020-06-22 PROCEDURE — 99212 OFFICE O/P EST SF 10 MIN: CPT

## 2020-09-14 ENCOUNTER — HOSPITAL ENCOUNTER (OUTPATIENT)
Age: 60
End: 2020-09-14
Payer: COMMERCIAL

## 2020-09-14 VITALS
OXYGEN SATURATION: 98 % | HEART RATE: 84 BPM | DIASTOLIC BLOOD PRESSURE: 92 MMHG | RESPIRATION RATE: 18 BRPM | SYSTOLIC BLOOD PRESSURE: 147 MMHG

## 2020-09-14 VITALS — BODY MASS INDEX: 34.9 KG/M2

## 2020-09-14 DIAGNOSIS — M48.062: ICD-10-CM

## 2020-09-14 DIAGNOSIS — M51.16: Primary | ICD-10-CM

## 2020-09-14 PROCEDURE — 72110 X-RAY EXAM L-2 SPINE 4/>VWS: CPT

## 2020-09-14 PROCEDURE — 99212 OFFICE O/P EST SF 10 MIN: CPT

## 2020-10-12 ENCOUNTER — HOSPITAL ENCOUNTER (OUTPATIENT)
Age: 60
End: 2020-10-12
Payer: COMMERCIAL

## 2020-10-12 VITALS
DIASTOLIC BLOOD PRESSURE: 85 MMHG | OXYGEN SATURATION: 98 % | HEART RATE: 74 BPM | TEMPERATURE: 98.6 F | SYSTOLIC BLOOD PRESSURE: 135 MMHG | RESPIRATION RATE: 18 BRPM

## 2020-10-12 VITALS — BODY MASS INDEX: 35.7 KG/M2

## 2020-10-12 DIAGNOSIS — M48.00: ICD-10-CM

## 2020-10-12 DIAGNOSIS — M51.16: Primary | ICD-10-CM

## 2020-10-12 PROCEDURE — 99212 OFFICE O/P EST SF 10 MIN: CPT

## 2020-10-22 ENCOUNTER — HOSPITAL ENCOUNTER (OUTPATIENT)
Age: 60
End: 2020-10-22
Payer: COMMERCIAL

## 2020-10-22 DIAGNOSIS — M54.5: Primary | ICD-10-CM

## 2020-10-22 PROCEDURE — 76376 3D RENDER W/INTRP POSTPROCES: CPT

## 2020-10-22 PROCEDURE — 72148 MRI LUMBAR SPINE W/O DYE: CPT

## 2020-11-05 ENCOUNTER — HOSPITAL ENCOUNTER (OUTPATIENT)
Age: 60
End: 2020-11-05
Payer: COMMERCIAL

## 2020-11-05 VITALS — BODY MASS INDEX: 35.7 KG/M2

## 2020-11-05 VITALS
RESPIRATION RATE: 18 BRPM | SYSTOLIC BLOOD PRESSURE: 133 MMHG | HEART RATE: 74 BPM | DIASTOLIC BLOOD PRESSURE: 74 MMHG | OXYGEN SATURATION: 98 %

## 2020-11-05 DIAGNOSIS — M48.062: ICD-10-CM

## 2020-11-05 DIAGNOSIS — M51.16: Primary | ICD-10-CM

## 2020-11-05 PROCEDURE — 99212 OFFICE O/P EST SF 10 MIN: CPT

## 2020-11-23 ENCOUNTER — HOSPITAL ENCOUNTER (OUTPATIENT)
Age: 60
End: 2020-11-23
Payer: COMMERCIAL

## 2020-11-23 VITALS
HEART RATE: 74 BPM | TEMPERATURE: 98.2 F | RESPIRATION RATE: 18 BRPM | SYSTOLIC BLOOD PRESSURE: 133 MMHG | OXYGEN SATURATION: 98 % | DIASTOLIC BLOOD PRESSURE: 78 MMHG

## 2020-11-23 VITALS — BODY MASS INDEX: 35.7 KG/M2

## 2020-11-23 DIAGNOSIS — M51.36: Primary | ICD-10-CM

## 2020-11-23 DIAGNOSIS — M47.816: ICD-10-CM

## 2020-11-23 PROCEDURE — 99212 OFFICE O/P EST SF 10 MIN: CPT

## 2020-12-21 ENCOUNTER — HOSPITAL ENCOUNTER (OUTPATIENT)
Age: 60
End: 2020-12-21
Payer: COMMERCIAL

## 2020-12-21 DIAGNOSIS — Z03.818: Primary | ICD-10-CM

## 2020-12-21 PROCEDURE — U0003 INFECTIOUS AGENT DETECTION BY NUCLEIC ACID (DNA OR RNA); SEVERE ACUTE RESPIRATORY SYNDROME CORONAVIRUS 2 (SARS-COV-2) (CORONAVIRUS DISEASE [COVID-19]), AMPLIFIED PROBE TECHNIQUE, MAKING USE OF HIGH THROUGHPUT TECHNOLOGIES AS DESCRIBED BY CMS-2020-01-R: HCPCS

## 2021-01-04 ENCOUNTER — HOSPITAL ENCOUNTER (OUTPATIENT)
Age: 61
End: 2021-01-04
Payer: COMMERCIAL

## 2021-01-04 VITALS
SYSTOLIC BLOOD PRESSURE: 138 MMHG | HEART RATE: 74 BPM | OXYGEN SATURATION: 98 % | RESPIRATION RATE: 18 BRPM | DIASTOLIC BLOOD PRESSURE: 85 MMHG

## 2021-01-04 VITALS — BODY MASS INDEX: 35.1 KG/M2

## 2021-01-04 DIAGNOSIS — M51.16: Primary | ICD-10-CM

## 2021-01-04 DIAGNOSIS — M47.896: ICD-10-CM

## 2021-01-04 PROCEDURE — 99212 OFFICE O/P EST SF 10 MIN: CPT

## 2021-01-04 PROCEDURE — G0463 HOSPITAL OUTPT CLINIC VISIT: HCPCS

## 2021-01-18 ENCOUNTER — HOSPITAL ENCOUNTER (OUTPATIENT)
Age: 61
End: 2021-01-18
Payer: COMMERCIAL

## 2021-01-18 DIAGNOSIS — Z12.31: Primary | ICD-10-CM

## 2021-01-18 PROCEDURE — 77067 SCR MAMMO BI INCL CAD: CPT

## 2021-01-18 PROCEDURE — 77063 BREAST TOMOSYNTHESIS BI: CPT

## 2021-01-23 ENCOUNTER — HOSPITAL ENCOUNTER (OUTPATIENT)
Age: 61
End: 2021-01-23
Payer: COMMERCIAL

## 2021-01-23 DIAGNOSIS — Z20.822: ICD-10-CM

## 2021-01-23 DIAGNOSIS — Z01.812: Primary | ICD-10-CM

## 2021-01-23 DIAGNOSIS — Z86.010: ICD-10-CM

## 2021-01-23 DIAGNOSIS — Z12.11: ICD-10-CM

## 2021-01-23 PROCEDURE — 86328 IA NFCT AB SARSCOV2 COVID19: CPT

## 2021-01-23 PROCEDURE — 36415 COLL VENOUS BLD VENIPUNCTURE: CPT

## 2021-01-25 ENCOUNTER — HOSPITAL ENCOUNTER (OUTPATIENT)
Dept: HOSPITAL 22 - OUTP | Age: 61
Discharge: HOME | End: 2021-01-25
Payer: COMMERCIAL

## 2021-01-25 ENCOUNTER — ON CAMPUS - OUTPATIENT (OUTPATIENT)
Dept: RURAL HOSPITAL 6 | Facility: HOSPITAL | Age: 61
End: 2021-01-25
Payer: MEDICAID

## 2021-01-25 VITALS
OXYGEN SATURATION: 100 % | RESPIRATION RATE: 18 BRPM | SYSTOLIC BLOOD PRESSURE: 137 MMHG | DIASTOLIC BLOOD PRESSURE: 77 MMHG | HEART RATE: 56 BPM

## 2021-01-25 VITALS
HEART RATE: 66 BPM | SYSTOLIC BLOOD PRESSURE: 118 MMHG | RESPIRATION RATE: 18 BRPM | OXYGEN SATURATION: 100 % | DIASTOLIC BLOOD PRESSURE: 83 MMHG

## 2021-01-25 VITALS
OXYGEN SATURATION: 99 % | TEMPERATURE: 97.6 F | HEART RATE: 71 BPM | DIASTOLIC BLOOD PRESSURE: 70 MMHG | SYSTOLIC BLOOD PRESSURE: 129 MMHG | RESPIRATION RATE: 18 BRPM

## 2021-01-25 VITALS
RESPIRATION RATE: 18 BRPM | DIASTOLIC BLOOD PRESSURE: 48 MMHG | SYSTOLIC BLOOD PRESSURE: 98 MMHG | OXYGEN SATURATION: 99 % | HEART RATE: 59 BPM | TEMPERATURE: 97 F

## 2021-01-25 VITALS — BODY MASS INDEX: 35.1 KG/M2

## 2021-01-25 VITALS
HEART RATE: 58 BPM | OXYGEN SATURATION: 100 % | SYSTOLIC BLOOD PRESSURE: 135 MMHG | RESPIRATION RATE: 18 BRPM | DIASTOLIC BLOOD PRESSURE: 86 MMHG

## 2021-01-25 VITALS — OXYGEN SATURATION: 98 %

## 2021-01-25 DIAGNOSIS — I10: ICD-10-CM

## 2021-01-25 DIAGNOSIS — Z86.010: ICD-10-CM

## 2021-01-25 DIAGNOSIS — J44.9: ICD-10-CM

## 2021-01-25 DIAGNOSIS — R00.2: ICD-10-CM

## 2021-01-25 DIAGNOSIS — K21.9: ICD-10-CM

## 2021-01-25 DIAGNOSIS — K63.5: ICD-10-CM

## 2021-01-25 DIAGNOSIS — Z12.11: Primary | ICD-10-CM

## 2021-01-25 DIAGNOSIS — I25.10: ICD-10-CM

## 2021-01-25 DIAGNOSIS — D12.4 BENIGN NEOPLASM OF DESCENDING COLON: ICD-10-CM

## 2021-01-25 DIAGNOSIS — R01.1: ICD-10-CM

## 2021-01-25 DIAGNOSIS — F32.9: ICD-10-CM

## 2021-01-25 DIAGNOSIS — E78.5: ICD-10-CM

## 2021-01-25 DIAGNOSIS — K63.5 POLYP OF COLON: ICD-10-CM

## 2021-01-25 DIAGNOSIS — Z86.010 PERSONAL HISTORY OF COLONIC POLYPS: ICD-10-CM

## 2021-01-25 DIAGNOSIS — K64.0: ICD-10-CM

## 2021-01-25 PROCEDURE — 45385 COLONOSCOPY W/LESION REMOVAL: CPT

## 2021-01-25 PROCEDURE — 0DJD8ZZ INSPECTION OF LOWER INTESTINAL TRACT, VIA NATURAL OR ARTIFICIAL OPENING ENDOSCOPIC: ICD-10-PCS | Performed by: INTERNAL MEDICINE

## 2021-01-25 PROCEDURE — 45385 COLONOSCOPY W/LESION REMOVAL: CPT | Mod: PT | Performed by: INTERNAL MEDICINE

## 2021-02-10 ENCOUNTER — HOSPITAL ENCOUNTER (OUTPATIENT)
Age: 61
End: 2021-02-10
Payer: COMMERCIAL

## 2021-02-10 DIAGNOSIS — M79.673: Primary | ICD-10-CM

## 2021-02-10 PROCEDURE — 73630 X-RAY EXAM OF FOOT: CPT

## 2021-02-16 ENCOUNTER — HOSPITAL ENCOUNTER (OUTPATIENT)
Age: 61
End: 2021-02-16
Payer: COMMERCIAL

## 2021-02-16 DIAGNOSIS — Z20.822: ICD-10-CM

## 2021-02-16 DIAGNOSIS — L72.0: ICD-10-CM

## 2021-02-16 DIAGNOSIS — Z01.818: Primary | ICD-10-CM

## 2021-02-16 LAB
CELLS COUNTED: 100
HCT VFR BLD CALC: 38.5 % (ref 37–47)
HGB BLD-MCNC: 12.5 G/DL (ref 12.2–16.2)
MANUAL DIFFERENTIAL: (no result)
MCHC RBC-ENTMCNC: 32.4 G/DL (ref 31.8–35.4)
MCV RBC: 87.3 FL (ref 81–99)
MEAN CORPUSCULAR HEMOGLOBIN: 28.3 PG (ref 27–31.2)
PLATELET # BLD: 394 K/MM3 (ref 142–424)
RBC # BLD AUTO: 4.41 M/MM3 (ref 4.2–5.4)
WBC # BLD AUTO: 7.5 K/MM3 (ref 4.8–10.8)

## 2021-02-16 PROCEDURE — 93005 ELECTROCARDIOGRAM TRACING: CPT

## 2021-02-16 PROCEDURE — 36415 COLL VENOUS BLD VENIPUNCTURE: CPT

## 2021-02-16 PROCEDURE — 86328 IA NFCT AB SARSCOV2 COVID19: CPT

## 2021-02-16 PROCEDURE — 85025 COMPLETE CBC W/AUTO DIFF WBC: CPT

## 2021-02-16 PROCEDURE — 85007 BL SMEAR W/DIFF WBC COUNT: CPT

## 2021-02-18 ENCOUNTER — HOSPITAL ENCOUNTER (OUTPATIENT)
Dept: HOSPITAL 22 - OR | Age: 61
Discharge: HOME | End: 2021-02-18
Payer: COMMERCIAL

## 2021-02-18 VITALS — BODY MASS INDEX: 34.7 KG/M2

## 2021-02-18 VITALS
SYSTOLIC BLOOD PRESSURE: 106 MMHG | OXYGEN SATURATION: 98 % | DIASTOLIC BLOOD PRESSURE: 57 MMHG | TEMPERATURE: 97.4 F | HEART RATE: 71 BPM | RESPIRATION RATE: 18 BRPM

## 2021-02-18 VITALS
TEMPERATURE: 97.34 F | HEART RATE: 71 BPM | DIASTOLIC BLOOD PRESSURE: 81 MMHG | SYSTOLIC BLOOD PRESSURE: 117 MMHG | OXYGEN SATURATION: 98 % | RESPIRATION RATE: 18 BRPM

## 2021-02-18 VITALS
SYSTOLIC BLOOD PRESSURE: 136 MMHG | DIASTOLIC BLOOD PRESSURE: 77 MMHG | RESPIRATION RATE: 18 BRPM | OXYGEN SATURATION: 98 % | HEART RATE: 58 BPM

## 2021-02-18 VITALS
HEART RATE: 68 BPM | DIASTOLIC BLOOD PRESSURE: 67 MMHG | RESPIRATION RATE: 18 BRPM | OXYGEN SATURATION: 100 % | SYSTOLIC BLOOD PRESSURE: 128 MMHG

## 2021-02-18 DIAGNOSIS — F32.9: ICD-10-CM

## 2021-02-18 DIAGNOSIS — Z83.3: ICD-10-CM

## 2021-02-18 DIAGNOSIS — J44.9: ICD-10-CM

## 2021-02-18 DIAGNOSIS — Z87.39: ICD-10-CM

## 2021-02-18 DIAGNOSIS — L72.0: Primary | ICD-10-CM

## 2021-02-18 DIAGNOSIS — E78.5: ICD-10-CM

## 2021-02-18 DIAGNOSIS — I25.10: ICD-10-CM

## 2021-02-18 DIAGNOSIS — K21.9: ICD-10-CM

## 2021-02-18 DIAGNOSIS — R00.2: ICD-10-CM

## 2021-02-18 DIAGNOSIS — R01.1: ICD-10-CM

## 2021-02-18 DIAGNOSIS — Z87.891: ICD-10-CM

## 2021-02-18 DIAGNOSIS — I10: ICD-10-CM

## 2021-02-18 PROCEDURE — 96375 TX/PRO/DX INJ NEW DRUG ADDON: CPT

## 2021-02-18 PROCEDURE — 96374 THER/PROPH/DIAG INJ IV PUSH: CPT

## 2021-02-18 PROCEDURE — 14040 TIS TRNFR F/C/C/M/N/A/G/H/F: CPT

## 2021-02-18 PROCEDURE — 14060 TIS TRNFR E/N/E/L 10 SQ CM/<: CPT

## 2021-03-03 ENCOUNTER — HOSPITAL ENCOUNTER (OUTPATIENT)
Age: 61
End: 2021-03-03
Payer: COMMERCIAL

## 2021-03-03 DIAGNOSIS — S90.852A: Primary | ICD-10-CM

## 2021-03-03 PROCEDURE — 76882 US LMTD JT/FCL EVL NVASC XTR: CPT

## 2021-04-12 ENCOUNTER — HOSPITAL ENCOUNTER (OUTPATIENT)
Age: 61
End: 2021-04-12
Payer: COMMERCIAL

## 2021-04-12 VITALS
HEART RATE: 65 BPM | DIASTOLIC BLOOD PRESSURE: 88 MMHG | SYSTOLIC BLOOD PRESSURE: 125 MMHG | OXYGEN SATURATION: 98 % | RESPIRATION RATE: 18 BRPM

## 2021-04-12 VITALS — BODY MASS INDEX: 34.4 KG/M2

## 2021-04-12 DIAGNOSIS — M47.896: ICD-10-CM

## 2021-04-12 DIAGNOSIS — M51.16: Primary | ICD-10-CM

## 2021-04-12 PROCEDURE — 99212 OFFICE O/P EST SF 10 MIN: CPT

## 2021-04-12 PROCEDURE — G0463 HOSPITAL OUTPT CLINIC VISIT: HCPCS

## 2021-05-13 ENCOUNTER — HOSPITAL ENCOUNTER (OUTPATIENT)
Age: 61
End: 2021-05-13
Payer: COMMERCIAL

## 2021-05-13 VITALS
RESPIRATION RATE: 18 BRPM | OXYGEN SATURATION: 98 % | SYSTOLIC BLOOD PRESSURE: 113 MMHG | HEART RATE: 78 BPM | DIASTOLIC BLOOD PRESSURE: 61 MMHG

## 2021-05-13 VITALS — BODY MASS INDEX: 33.7 KG/M2

## 2021-05-13 DIAGNOSIS — M51.16: Primary | ICD-10-CM

## 2021-05-13 DIAGNOSIS — R09.89: Primary | ICD-10-CM

## 2021-05-13 PROCEDURE — 93923 UPR/LXTR ART STDY 3+ LVLS: CPT

## 2021-05-13 PROCEDURE — 99212 OFFICE O/P EST SF 10 MIN: CPT

## 2021-05-13 PROCEDURE — G0463 HOSPITAL OUTPT CLINIC VISIT: HCPCS

## 2021-06-10 ENCOUNTER — HOSPITAL ENCOUNTER (OUTPATIENT)
Age: 61
End: 2021-06-10
Payer: COMMERCIAL

## 2021-06-10 VITALS — BODY MASS INDEX: 36.2 KG/M2

## 2021-06-10 VITALS
SYSTOLIC BLOOD PRESSURE: 118 MMHG | DIASTOLIC BLOOD PRESSURE: 59 MMHG | OXYGEN SATURATION: 96 % | RESPIRATION RATE: 18 BRPM | HEART RATE: 87 BPM

## 2021-06-10 DIAGNOSIS — M51.16: Primary | ICD-10-CM

## 2021-06-10 PROCEDURE — G0463 HOSPITAL OUTPT CLINIC VISIT: HCPCS

## 2021-06-10 PROCEDURE — 99212 OFFICE O/P EST SF 10 MIN: CPT

## 2021-07-30 ENCOUNTER — HOSPITAL ENCOUNTER (OUTPATIENT)
Age: 61
End: 2021-07-30
Payer: COMMERCIAL

## 2021-07-30 DIAGNOSIS — N20.0: ICD-10-CM

## 2021-07-30 DIAGNOSIS — M54.9: Primary | ICD-10-CM

## 2021-07-30 PROCEDURE — 74018 RADEX ABDOMEN 1 VIEW: CPT

## 2021-08-03 ENCOUNTER — HOSPITAL ENCOUNTER (OUTPATIENT)
Age: 61
End: 2021-08-03
Payer: COMMERCIAL

## 2021-08-03 DIAGNOSIS — E78.5: ICD-10-CM

## 2021-08-03 DIAGNOSIS — I25.10: Primary | ICD-10-CM

## 2021-08-03 DIAGNOSIS — I10: ICD-10-CM

## 2021-08-03 DIAGNOSIS — E66.9: ICD-10-CM

## 2021-08-03 DIAGNOSIS — Z87.891: ICD-10-CM

## 2021-08-03 LAB
25-OH VITAMIN D, TOTAL: 33 NG/ML (ref 30–100)
ALBUMIN LEVEL: 4.3 G/DL (ref 3.5–5)
ALBUMIN/GLOB SERPL: 1.5 {RATIO} (ref 1.1–1.8)
ALP ISO SERPL-ACNC: 108 U/L (ref 38–126)
ALT SERPLBLD-CCNC: 24 U/L (ref 12–78)
ANION GAP SERPL CALC-SCNC: 12.9 MEQ/L (ref 5–15)
AST SERPL QL: 31 U/L (ref 14–36)
BILIRUBIN,TOTAL: 0.3 MG/DL (ref 0.2–1.3)
BUN SERPL-MCNC: 23 MG/DL (ref 7–17)
CALCIUM SPEC-MCNC: 9.5 MG/DL (ref 8.4–10.2)
CHLORIDE SPEC-SCNC: 103 MMOL/L (ref 98–107)
CHOLEST SPEC-SCNC: 194 MG/DL (ref 140–200)
CO2 SERPL-SCNC: 27 MMOL/L (ref 22–30)
CREAT BLD-SCNC: 0.6 MG/DL (ref 0.52–1.04)
ESTIMATED GLOMERULAR FILT RATE: 103 ML/MIN (ref 60–?)
FOLATE: > 20 NG/ML
GFR (AFRICAN AMERICAN): 124 ML/MIN (ref 60–?)
GLOBULIN SER CALC-MCNC: 2.9 G/DL (ref 1.3–3.2)
GLUCOSE: 105 MG/DL (ref 74–100)
HBA1C MFR BLD: 6 % (ref 4–6)
HCT VFR BLD CALC: 36.8 % (ref 37–47)
HDLC SERPL-MCNC: 63 MG/DL (ref 40–60)
HGB BLD-MCNC: 12.2 G/DL (ref 12.2–16.2)
MCHC RBC-ENTMCNC: 33.2 G/DL (ref 31.8–35.4)
MCV RBC: 86.2 FL (ref 81–99)
MEAN CORPUSCULAR HEMOGLOBIN: 28.6 PG (ref 27–31.2)
PLATELET # BLD: 433 K/MM3 (ref 142–424)
POTASSIUM: 4.9 MMOL/L (ref 3.5–5.1)
PROT SERPL-MCNC: 7.2 G/DL (ref 6.3–8.2)
RBC # BLD AUTO: 4.27 M/MM3 (ref 4.2–5.4)
SODIUM SPEC-SCNC: 138 MMOL/L (ref 136–145)
T4 (THYROXINE): 4.8 UG/DL (ref 5.53–11)
TRIGLYCERIDES: 287 MG/DL (ref 30–150)
TSH SERPL-ACNC: 1.09 UIU/ML (ref 0.47–4.68)
VITAMIN B12: 461 PG/ML (ref 239–931)
WBC # BLD AUTO: 8.9 K/MM3 (ref 4.8–10.8)

## 2021-08-03 PROCEDURE — 82607 VITAMIN B-12: CPT

## 2021-08-03 PROCEDURE — 85025 COMPLETE CBC W/AUTO DIFF WBC: CPT

## 2021-08-03 PROCEDURE — 80053 COMPREHEN METABOLIC PANEL: CPT

## 2021-08-03 PROCEDURE — 83036 HEMOGLOBIN GLYCOSYLATED A1C: CPT

## 2021-08-03 PROCEDURE — 82746 ASSAY OF FOLIC ACID SERUM: CPT

## 2021-08-03 PROCEDURE — 84443 ASSAY THYROID STIM HORMONE: CPT

## 2021-08-03 PROCEDURE — 80061 LIPID PANEL: CPT

## 2021-08-03 PROCEDURE — 84436 ASSAY OF TOTAL THYROXINE: CPT

## 2021-08-03 PROCEDURE — 82306 VITAMIN D 25 HYDROXY: CPT

## 2021-08-19 ENCOUNTER — HOSPITAL ENCOUNTER (OUTPATIENT)
Age: 61
End: 2021-08-19
Payer: COMMERCIAL

## 2021-08-19 VITALS
DIASTOLIC BLOOD PRESSURE: 65 MMHG | SYSTOLIC BLOOD PRESSURE: 134 MMHG | RESPIRATION RATE: 18 BRPM | OXYGEN SATURATION: 95 % | TEMPERATURE: 98.06 F | HEART RATE: 102 BPM

## 2021-08-19 VITALS — BODY MASS INDEX: 36.6 KG/M2

## 2021-08-19 DIAGNOSIS — M51.16: Primary | ICD-10-CM

## 2021-08-19 PROCEDURE — 99212 OFFICE O/P EST SF 10 MIN: CPT

## 2021-08-19 PROCEDURE — G0463 HOSPITAL OUTPT CLINIC VISIT: HCPCS

## 2021-08-30 ENCOUNTER — HOSPITAL ENCOUNTER (OUTPATIENT)
Age: 61
End: 2021-08-30
Payer: COMMERCIAL

## 2021-08-30 DIAGNOSIS — R41.3: Primary | ICD-10-CM

## 2021-08-30 PROCEDURE — 86225 DNA ANTIBODY NATIVE: CPT

## 2021-08-30 PROCEDURE — 86592 SYPHILIS TEST NON-TREP QUAL: CPT

## 2021-08-30 PROCEDURE — 86235 NUCLEAR ANTIGEN ANTIBODY: CPT

## 2021-08-30 PROCEDURE — 36415 COLL VENOUS BLD VENIPUNCTURE: CPT

## 2021-08-30 PROCEDURE — 86038 ANTINUCLEAR ANTIBODIES: CPT

## 2021-09-07 ENCOUNTER — HOSPITAL ENCOUNTER (OUTPATIENT)
Age: 61
End: 2021-09-07
Payer: COMMERCIAL

## 2021-09-07 DIAGNOSIS — Z87.891: Primary | ICD-10-CM

## 2021-09-07 DIAGNOSIS — Z78.0: ICD-10-CM

## 2021-09-07 DIAGNOSIS — Z12.2: ICD-10-CM

## 2021-09-07 DIAGNOSIS — Z13.820: ICD-10-CM

## 2021-09-07 PROCEDURE — 71271 CT THORAX LUNG CANCER SCR C-: CPT

## 2021-09-07 PROCEDURE — 77080 DXA BONE DENSITY AXIAL: CPT

## 2021-09-09 LAB
ANA SER QL: NEGATIVE
ANTI-CENTROMERE B ABS CHARGE: (no result)
ANTI-DNA (DS) AB CHARGE: (no result)
ANTI-JO-1 CHARGE: (no result)
ANTICHROMATIN ABS CHARGE: (no result)
ANTISCLERODERMA-70 ABS CHARGE: (no result)
RNP ANTIBODIES CHARGE: (no result)
SJOGREN'S ANTI-SS-A AB CHARGE: (no result)
SJOGREN'S ANTI-SS-B AB CHARGE: (no result)
SMITH ANTIBODIES CHARGE: (no result)

## 2021-09-13 ENCOUNTER — HOSPITAL ENCOUNTER (OUTPATIENT)
Age: 61
End: 2021-09-13
Payer: COMMERCIAL

## 2021-09-13 ENCOUNTER — TELEPHONE (OUTPATIENT)
Dept: NEUROSURGERY | Facility: CLINIC | Age: 61
End: 2021-09-13

## 2021-09-13 DIAGNOSIS — R41.3: Primary | ICD-10-CM

## 2021-09-13 DIAGNOSIS — R44.0: ICD-10-CM

## 2021-09-13 DIAGNOSIS — Z20.822: ICD-10-CM

## 2021-09-13 DIAGNOSIS — R44.1: ICD-10-CM

## 2021-09-13 PROCEDURE — U0005 INFEC AGEN DETEC AMPLI PROBE: HCPCS

## 2021-09-13 PROCEDURE — 95819 EEG AWAKE AND ASLEEP: CPT

## 2021-09-13 PROCEDURE — C9803 HOPD COVID-19 SPEC COLLECT: HCPCS

## 2021-09-13 PROCEDURE — 70551 MRI BRAIN STEM W/O DYE: CPT

## 2021-09-13 PROCEDURE — U0003 INFECTIOUS AGENT DETECTION BY NUCLEIC ACID (DNA OR RNA); SEVERE ACUTE RESPIRATORY SYNDROME CORONAVIRUS 2 (SARS-COV-2) (CORONAVIRUS DISEASE [COVID-19]), AMPLIFIED PROBE TECHNIQUE, MAKING USE OF HIGH THROUGHPUT TECHNOLOGIES AS DESCRIBED BY CMS-2020-01-R: HCPCS

## 2021-09-13 NOTE — TELEPHONE ENCOUNTER
PATIENT CALLED TO RESCHEDULE HER 09/14/21 APPT WITH AYAN MOBLEY. PATIENT STATED SHE SHE IS NOT FEELING WELL AND IS GETTING TESTED FOR COVID TODAY @ 1:30 PM. RESCHEDULED PATIENT NEXT AVAILABLE 10/12/21.

## 2021-09-27 ENCOUNTER — HOSPITAL ENCOUNTER (OUTPATIENT)
Age: 61
End: 2021-09-27
Payer: COMMERCIAL

## 2021-09-27 DIAGNOSIS — I10: Primary | ICD-10-CM

## 2021-09-27 LAB
BUN SERPL-MCNC: 11 MG/DL (ref 7–17)
CREAT BLD-SCNC: 0.6 MG/DL (ref 0.52–1.04)
ESTIMATED GLOMERULAR FILT RATE: 102 ML/MIN (ref 60–?)
GFR (AFRICAN AMERICAN): 124 ML/MIN (ref 60–?)

## 2021-09-27 PROCEDURE — 82565 ASSAY OF CREATININE: CPT

## 2021-09-27 PROCEDURE — 36415 COLL VENOUS BLD VENIPUNCTURE: CPT

## 2021-09-27 PROCEDURE — 84520 ASSAY OF UREA NITROGEN: CPT

## 2021-09-30 ENCOUNTER — HOSPITAL ENCOUNTER (OUTPATIENT)
Age: 61
End: 2021-09-30
Payer: COMMERCIAL

## 2021-09-30 DIAGNOSIS — I25.10: ICD-10-CM

## 2021-09-30 DIAGNOSIS — R44.0: ICD-10-CM

## 2021-09-30 DIAGNOSIS — R41.3: ICD-10-CM

## 2021-09-30 DIAGNOSIS — R90.89: ICD-10-CM

## 2021-09-30 DIAGNOSIS — R06.00: Primary | ICD-10-CM

## 2021-09-30 DIAGNOSIS — E78.5: ICD-10-CM

## 2021-09-30 DIAGNOSIS — I10: ICD-10-CM

## 2021-09-30 DIAGNOSIS — R44.1: ICD-10-CM

## 2021-09-30 PROCEDURE — 70496 CT ANGIOGRAPHY HEAD: CPT

## 2021-09-30 PROCEDURE — 93306 TTE W/DOPPLER COMPLETE: CPT

## 2021-10-05 ENCOUNTER — HOSPITAL ENCOUNTER (OUTPATIENT)
Age: 61
End: 2021-10-05
Payer: COMMERCIAL

## 2021-10-05 DIAGNOSIS — R42: ICD-10-CM

## 2021-10-05 DIAGNOSIS — Z20.822: ICD-10-CM

## 2021-10-05 DIAGNOSIS — R00.2: Primary | ICD-10-CM

## 2021-10-05 LAB — T4 FREE SERPL-MCNC: 0.72 NG/DL (ref 0.78–2.19)

## 2021-10-05 PROCEDURE — 86328 IA NFCT AB SARSCOV2 COVID19: CPT

## 2021-10-05 PROCEDURE — 36415 COLL VENOUS BLD VENIPUNCTURE: CPT

## 2021-10-05 PROCEDURE — 84439 ASSAY OF FREE THYROXINE: CPT

## 2021-10-12 ENCOUNTER — OFFICE VISIT (OUTPATIENT)
Dept: NEUROSURGERY | Facility: CLINIC | Age: 61
End: 2021-10-12

## 2021-10-12 VITALS
SYSTOLIC BLOOD PRESSURE: 126 MMHG | WEIGHT: 225 LBS | TEMPERATURE: 97.6 F | DIASTOLIC BLOOD PRESSURE: 68 MMHG | HEIGHT: 64 IN | BODY MASS INDEX: 38.41 KG/M2

## 2021-10-12 DIAGNOSIS — M47.819 FACET ARTHROPATHY: ICD-10-CM

## 2021-10-12 DIAGNOSIS — M54.9 MECHANICAL BACK PAIN: ICD-10-CM

## 2021-10-12 DIAGNOSIS — M51.36 DISC DEGENERATION, LUMBAR: Primary | ICD-10-CM

## 2021-10-12 DIAGNOSIS — M48.062 SPINAL STENOSIS, LUMBAR REGION, WITH NEUROGENIC CLAUDICATION: ICD-10-CM

## 2021-10-12 PROCEDURE — 99204 OFFICE O/P NEW MOD 45 MIN: CPT | Performed by: PHYSICIAN ASSISTANT

## 2021-10-12 RX ORDER — DEXAMETHASONE 4 MG/1
TABLET ORAL
COMMUNITY
Start: 2021-09-15

## 2021-10-12 RX ORDER — GABAPENTIN 600 MG/1
600 TABLET ORAL 3 TIMES DAILY
COMMUNITY
Start: 2021-09-20 | End: 2022-02-09

## 2021-10-12 RX ORDER — LEVOCETIRIZINE DIHYDROCHLORIDE 5 MG/1
TABLET, FILM COATED ORAL
COMMUNITY
Start: 2021-09-15

## 2021-10-12 RX ORDER — ATORVASTATIN CALCIUM 40 MG/1
TABLET, FILM COATED ORAL
COMMUNITY
Start: 2021-09-27

## 2021-10-12 RX ORDER — MONTELUKAST SODIUM 10 MG/1
TABLET ORAL
COMMUNITY
Start: 2021-09-15

## 2021-10-12 RX ORDER — LISINOPRIL AND HYDROCHLOROTHIAZIDE 12.5; 1 MG/1; MG/1
TABLET ORAL
COMMUNITY
Start: 2021-09-15

## 2021-10-12 RX ORDER — DULOXETIN HYDROCHLORIDE 60 MG/1
CAPSULE, DELAYED RELEASE ORAL
COMMUNITY
Start: 2021-09-29

## 2021-10-12 RX ORDER — DICLOFENAC SODIUM 75 MG/1
TABLET, DELAYED RELEASE ORAL
COMMUNITY
Start: 2021-08-19 | End: 2022-01-04

## 2021-10-12 NOTE — PROGRESS NOTES
Patient: Dayana Franks  : 1960  Chart #: [id number]    Date of Service: 10/12/2021    CHIEF COMPLAINT: Back and bilateral lower extremity pain    History of Present Illness Ms. 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  She has chronic low back difficulties.  In 2019 she underwent a Vertiflex procedure with Dr. Richardson.  She has had ongoing low back difficulties and has recently filed for disability.  Pain is in the low back and extends down the posterior aspect of both legs all the way to the feet.  She describes pain in the left hip and thigh.  Symptoms are worse with bending, stooping, and walking.  Pain is alleviated when sitting and occasionally when laying down.  Historically she has tried physical therapy, epidural injections, and gabapentin.  Recently she has been weaning from gabapentin because it makes her feel foggy.  She denies focal weakness.  No sensory alteration or bowel bladder difficulties.        Past Medical History:   Diagnosis Date   • Hypertension          Current Outpatient Medications:   •  atorvastatin (LIPITOR) 40 MG tablet, , Disp: , Rfl:   •  diclofenac (VOLTAREN) 75 MG EC tablet, , Disp: , Rfl:   •  DULoxetine (CYMBALTA) 60 MG capsule, , Disp: , Rfl:   •  Flovent  MCG/ACT inhaler, , Disp: , Rfl:   •  gabapentin (NEURONTIN) 600 MG tablet, Take 600 mg by mouth 3 (Three) Times a Day., Disp: , Rfl:   •  levocetirizine (XYZAL) 5 MG tablet, , Disp: , Rfl:   •  lisinopril-hydrochlorothiazide (PRINZIDE,ZESTORETIC) 10-12.5 MG per tablet, , Disp: , Rfl:   •  montelukast (SINGULAIR) 10 MG tablet, , Disp: , Rfl:     Past Surgical History:   Procedure Laterality Date   • BACK SURGERY  2019       Social History     Socioeconomic History   • Marital status:    Tobacco Use   • Smoking status: Former Smoker   • Smokeless tobacco: Never Used   Substance and Sexual Activity   • Alcohol use: Yes   • Drug use: Yes   • Sexual activity:  "Defer         Review of Systems   Constitutional: Positive for activity change.   HENT: Positive for dental problem, postnasal drip, rhinorrhea, sinus pressure, sinus pain, sneezing, sore throat and voice change.    Eyes: Positive for itching and visual disturbance.   Respiratory: Positive for shortness of breath.    Gastrointestinal: Positive for constipation and nausea.   Endocrine: Positive for heat intolerance.   Musculoskeletal: Positive for back pain and neck pain.   Allergic/Immunologic: Positive for environmental allergies.   Neurological: Positive for dizziness, speech difficulty, weakness and headaches.   Hematological: Bruises/bleeds easily.   Psychiatric/Behavioral: Positive for confusion, decreased concentration, hallucinations and sleep disturbance. The patient is nervous/anxious.    All other systems reviewed and are negative.      Objective   Vital Signs: Blood pressure 126/68, temperature 97.6 °F (36.4 °C), height 162.6 cm (64\"), weight 102 kg (225 lb).  Physical Exam  Vitals and nursing note reviewed.   Constitutional:       General: She is not in acute distress.     Appearance: She is well-developed.   HENT:      Head: Normocephalic and atraumatic.   Eyes:      Pupils: Pupils are equal, round, and reactive to light.   Cardiovascular:      Heart sounds: Normal heart sounds.   Pulmonary:      Breath sounds: Normal breath sounds.   Psychiatric:         Behavior: Behavior normal.         Thought Content: Thought content normal.     Musculoskeletal:     Strength is intact in upper and lower extremities to direct testing.     Station and gait are normal.     Straight leg raising is negative. Roberto sign positive on the left.  Neurologic:     Muscle tone is normal throughout.     Coordination is intact.     Deep tendon reflexes: 2+ and symmetrical.     Sensation is intact to light touch throughout.     Patient is oriented to person, place, and time.         Independent review of radiographic imaging: " MRI of the lumbar spine from 2020 demonstrates diffuse degenerative disc disease.  There is some disc bulge and facet arthropathy at L4-5 with some mild to moderate canal stenosis.    Assessment/Plan   Diagnosis:  1. Mechanical back pain  2. Lumbar stenosis    Medical Decision Making: I referred patient for a new MRI of the lumbar spine with and without gadolinium as well as xrays. She will follow up with me to review and further recommendations will be made at that time.     Diagnoses and all orders for this visit:    1. Disc degeneration, lumbar (Primary)  -     MRI Lumbar Spine With & Without Contrast; Future  -     XR Spine Lumbar 2 or 3 View; Future    2. Mechanical back pain  -     MRI Lumbar Spine With & Without Contrast; Future  -     XR Spine Lumbar 2 or 3 View; Future    3. Facet arthropathy  -     MRI Lumbar Spine With & Without Contrast; Future  -     XR Spine Lumbar 2 or 3 View; Future    4. Spinal stenosis, lumbar region, with neurogenic claudication  -     MRI Lumbar Spine With & Without Contrast; Future  -     XR Spine Lumbar 2 or 3 View; Future                        Patient's Body mass index is 38.62 kg/m². indicating that she is morbidly obese (BMI > 40 or > 35 with obesity - related health condition). Obesity-related health conditions include the following: hypertension. Obesity is unchanged. BMI is is above average; BMI management plan is completed. We discussed portion control and increasing exercise..         Audrey Snowden PA-C  Patient Care Team:  Nigel Phan MD as PCP - General (Emergency Medicine)  Nigel Phan MD as Referring Physician (Emergency Medicine)

## 2021-10-18 ENCOUNTER — HOSPITAL ENCOUNTER (OUTPATIENT)
Age: 61
End: 2021-10-18
Payer: COMMERCIAL

## 2021-10-18 DIAGNOSIS — Z20.822: Primary | ICD-10-CM

## 2021-10-18 PROCEDURE — C9803 HOPD COVID-19 SPEC COLLECT: HCPCS

## 2021-10-18 PROCEDURE — U0005 INFEC AGEN DETEC AMPLI PROBE: HCPCS

## 2021-10-18 PROCEDURE — U0003 INFECTIOUS AGENT DETECTION BY NUCLEIC ACID (DNA OR RNA); SEVERE ACUTE RESPIRATORY SYNDROME CORONAVIRUS 2 (SARS-COV-2) (CORONAVIRUS DISEASE [COVID-19]), AMPLIFIED PROBE TECHNIQUE, MAKING USE OF HIGH THROUGHPUT TECHNOLOGIES AS DESCRIBED BY CMS-2020-01-R: HCPCS

## 2021-11-10 ENCOUNTER — HOSPITAL ENCOUNTER (OUTPATIENT)
Dept: MRI IMAGING | Facility: HOSPITAL | Age: 61
Discharge: HOME | End: 2021-11-10

## 2021-11-10 ENCOUNTER — APPOINTMENT (OUTPATIENT)
Dept: OTHER | Facility: HOSPITAL | Age: 61
End: 2021-11-10

## 2021-11-10 ENCOUNTER — APPOINTMENT (OUTPATIENT)
Dept: MRI IMAGING | Facility: HOSPITAL | Age: 61
End: 2021-11-10

## 2021-11-10 ENCOUNTER — OFFICE VISIT (OUTPATIENT)
Dept: NEUROSURGERY | Facility: CLINIC | Age: 61
End: 2021-11-10

## 2021-11-10 ENCOUNTER — DOCUMENTATION (OUTPATIENT)
Dept: NEUROSURGERY | Facility: CLINIC | Age: 61
End: 2021-11-10

## 2021-11-10 ENCOUNTER — HOSPITAL ENCOUNTER (OUTPATIENT)
Dept: GENERAL RADIOLOGY | Facility: HOSPITAL | Age: 61
Discharge: HOME | End: 2021-11-10

## 2021-11-10 VITALS
HEIGHT: 64 IN | BODY MASS INDEX: 38.24 KG/M2 | OXYGEN SATURATION: 98 % | HEART RATE: 76 BPM | WEIGHT: 224 LBS | TEMPERATURE: 97.1 F

## 2021-11-10 DIAGNOSIS — M48.062 SPINAL STENOSIS, LUMBAR REGION, WITH NEUROGENIC CLAUDICATION: ICD-10-CM

## 2021-11-10 DIAGNOSIS — M51.36 DISC DEGENERATION, LUMBAR: ICD-10-CM

## 2021-11-10 DIAGNOSIS — M54.9 MECHANICAL BACK PAIN: ICD-10-CM

## 2021-11-10 DIAGNOSIS — M47.819 FACET ARTHROPATHY: ICD-10-CM

## 2021-11-10 DIAGNOSIS — M51.36 DISC DEGENERATION, LUMBAR: Primary | ICD-10-CM

## 2021-11-10 DIAGNOSIS — M48.062 SPINAL STENOSIS, LUMBAR REGION, WITH NEUROGENIC CLAUDICATION: Primary | ICD-10-CM

## 2021-11-10 PROCEDURE — 72100 X-RAY EXAM L-S SPINE 2/3 VWS: CPT

## 2021-11-10 PROCEDURE — 72158 MRI LUMBAR SPINE W/O & W/DYE: CPT

## 2021-11-10 PROCEDURE — 0 GADOBENATE DIMEGLUMINE 529 MG/ML SOLUTION: Performed by: PHYSICIAN ASSISTANT

## 2021-11-10 PROCEDURE — 99214 OFFICE O/P EST MOD 30 MIN: CPT | Performed by: PHYSICIAN ASSISTANT

## 2021-11-10 PROCEDURE — A9577 INJ MULTIHANCE: HCPCS | Performed by: PHYSICIAN ASSISTANT

## 2021-11-10 RX ADMIN — GADOBENATE DIMEGLUMINE 20 ML: 529 INJECTION, SOLUTION INTRAVENOUS at 10:03

## 2021-11-10 NOTE — PROGRESS NOTES
Loaded patient's MRI and XR into chart.     I gave disc back to patient, same day, at office visit.

## 2021-11-10 NOTE — PROGRESS NOTES
Patient: Dayana Franks  : 1960  Chart #: [id number]    Date of Service: 2021    CHIEF COMPLAINT: Back and bilateral lower extremity pain with walking and standing intolerance    History of Present Illness Ms. Franks is seen in follow-up.  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  She has chronic low back difficulties.  In 2019 she underwent a Vertiflex procedure with Dr. Richardson.  She has had ongoing low back difficulties and has recently filed for disability.  Pain is in the low back and extends down the posterior aspect of both legs all the way to the feet.  She describes pain in the left hip and thigh.  Symptoms are worse with bending, stooping, and particularly when walking.  Pain is alleviated when sitting and when laying down.  Historically she has tried physical therapy, epidural injections, and gabapentin.  Recently she has been weaning from gabapentin because it makes her feel foggy.  She denies focal weakness.  No sensory alteration or bowel bladder difficulties.  She mentions standing on her feet all day while at work significantly exacerbates symptoms which is why she is filing for disability.        Past Medical History:   Diagnosis Date   • Hypertension          Current Outpatient Medications:   •  atorvastatin (LIPITOR) 40 MG tablet, , Disp: , Rfl:   •  diclofenac (VOLTAREN) 75 MG EC tablet, , Disp: , Rfl:   •  DULoxetine (CYMBALTA) 60 MG capsule, , Disp: , Rfl:   •  Flovent  MCG/ACT inhaler, , Disp: , Rfl:   •  gabapentin (NEURONTIN) 600 MG tablet, Take 600 mg by mouth 3 (Three) Times a Day., Disp: , Rfl:   •  levocetirizine (XYZAL) 5 MG tablet, , Disp: , Rfl:   •  lisinopril-hydrochlorothiazide (PRINZIDE,ZESTORETIC) 10-12.5 MG per tablet, , Disp: , Rfl:   •  montelukast (SINGULAIR) 10 MG tablet, , Disp: , Rfl:   No current facility-administered medications for this visit.    Past Surgical History:   Procedure Laterality Date   •  "BACK SURGERY  2019       Social History     Socioeconomic History   • Marital status:    Tobacco Use   • Smoking status: Former Smoker   • Smokeless tobacco: Never Used   Vaping Use   • Vaping Use: Never used   Substance and Sexual Activity   • Alcohol use: Yes   • Drug use: Yes   • Sexual activity: Defer         Review of Systems   Constitutional: Positive for activity change.   HENT: Positive for dental problem, postnasal drip, rhinorrhea, sinus pressure, sinus pain, sneezing, sore throat and voice change.    Eyes: Positive for itching and visual disturbance.   Respiratory: Positive for shortness of breath.    Gastrointestinal: Positive for constipation and nausea.   Endocrine: Positive for heat intolerance.   Musculoskeletal: Positive for back pain and neck pain.   Allergic/Immunologic: Positive for environmental allergies.   Neurological: Positive for dizziness, speech difficulty, weakness and headaches.   Hematological: Bruises/bleeds easily.   Psychiatric/Behavioral: Positive for confusion, decreased concentration, hallucinations and sleep disturbance. The patient is nervous/anxious.    All other systems reviewed and are negative.      Objective   Vital Signs: Pulse 76, temperature 97.1 °F (36.2 °C), height 162.6 cm (64.02\"), weight 102 kg (224 lb), SpO2 98 %.  Physical Exam  Vitals and nursing note reviewed.   Constitutional:       General: She is not in acute distress.     Appearance: She is well-developed.   HENT:      Head: Normocephalic and atraumatic.   Eyes:      Pupils: Pupils are equal, round, and reactive to light.   Cardiovascular:      Heart sounds: Normal heart sounds.   Pulmonary:      Breath sounds: Normal breath sounds.   Psychiatric:         Behavior: Behavior normal.         Thought Content: Thought content normal.     Musculoskeletal:     Strength is intact in upper and lower extremities to direct testing.     Station and gait are normal.     Straight leg raising is negative. Roberto " sign positive on the left.  Neurologic:     Muscle tone is normal throughout.     Coordination is intact.     Deep tendon reflexes: 2+ and symmetrical.     Sensation is intact to light touch throughout.     Patient is oriented to person, place, and time.      Independent review of radiographic imaging: MRI of the lumbar spine dated 11/10/2021 demonstrates multilevel degenerative disc disease.  There is some disc bulge and facet arthropathy at L4-5 with moderate canal stenosis.    Assessment/Plan   Diagnosis:  1.  Lumbar stenosis with at least some degree of neurogenic claudication  2.  Mechanical low back pain    Medical Decision Making: I am going to refer patient for a CT lumbar myelogram to see if stenosis is worse on upright imaging.  If she proves to have severe canal narrowing at L4-5 on upright imaging then surgery may be an option.  I have explained to her that this will not likely eradicate all of her back difficulties. She is pursuing disability as she feels that the nature of her work exacerbates her pain. She will follow up with Dr Lang following the myelogram and further recommendations will be made at that time.       Diagnoses and all orders for this visit:    1. Disc degeneration, lumbar (Primary)    2. Mechanical back pain    3. Facet arthropathy    4. Spinal stenosis, lumbar region, with neurogenic claudication  -     IR Myelogram Lumbar Spine; Future  -     CT Lumbar Spine With Intrathecal Contrast; Future                        Patient's Body mass index is 38.43 kg/m². indicating that she is morbidly obese (BMI > 40 or > 35 with obesity - related health condition). Obesity-related health conditions include the following: hypertension. Obesity is unchanged. BMI is is above average; BMI management plan is completed. We discussed portion control and increasing exercise..         Audrey Snowden PA-C  Patient Care Team:  Nigel Phan MD as PCP - General (Emergency Medicine)  Emilie  Nigel GOEL MD as Referring Physician (Emergency Medicine)

## 2021-12-01 ENCOUNTER — HOSPITAL ENCOUNTER (OUTPATIENT)
Age: 61
End: 2021-12-01
Payer: COMMERCIAL

## 2021-12-01 VITALS — HEART RATE: 68 BPM

## 2021-12-01 DIAGNOSIS — R06.00: Primary | ICD-10-CM

## 2021-12-01 PROCEDURE — 94729 DIFFUSING CAPACITY: CPT

## 2021-12-01 PROCEDURE — 94640 AIRWAY INHALATION TREATMENT: CPT

## 2021-12-01 PROCEDURE — 94618 PULMONARY STRESS TESTING: CPT

## 2021-12-01 PROCEDURE — 94726 PLETHYSMOGRAPHY LUNG VOLUMES: CPT

## 2021-12-01 PROCEDURE — 94060 EVALUATION OF WHEEZING: CPT

## 2021-12-05 ENCOUNTER — HOSPITAL ENCOUNTER (EMERGENCY)
Age: 61
Discharge: HOME | End: 2021-12-05
Payer: COMMERCIAL

## 2021-12-05 VITALS
OXYGEN SATURATION: 99 % | SYSTOLIC BLOOD PRESSURE: 136 MMHG | HEART RATE: 89 BPM | TEMPERATURE: 98 F | DIASTOLIC BLOOD PRESSURE: 59 MMHG | RESPIRATION RATE: 20 BRPM

## 2021-12-05 VITALS
DIASTOLIC BLOOD PRESSURE: 59 MMHG | TEMPERATURE: 97.88 F | RESPIRATION RATE: 20 BRPM | HEART RATE: 89 BPM | SYSTOLIC BLOOD PRESSURE: 136 MMHG

## 2021-12-05 VITALS — BODY MASS INDEX: 36.6 KG/M2

## 2021-12-05 DIAGNOSIS — I25.10: ICD-10-CM

## 2021-12-05 DIAGNOSIS — J44.9: ICD-10-CM

## 2021-12-05 DIAGNOSIS — K21.9: ICD-10-CM

## 2021-12-05 DIAGNOSIS — Z20.822: ICD-10-CM

## 2021-12-05 DIAGNOSIS — B34.9: ICD-10-CM

## 2021-12-05 DIAGNOSIS — F41.8: ICD-10-CM

## 2021-12-05 DIAGNOSIS — J20.9: Primary | ICD-10-CM

## 2021-12-05 PROCEDURE — 87804 INFLUENZA ASSAY W/OPTIC: CPT

## 2021-12-05 PROCEDURE — U0003 INFECTIOUS AGENT DETECTION BY NUCLEIC ACID (DNA OR RNA); SEVERE ACUTE RESPIRATORY SYNDROME CORONAVIRUS 2 (SARS-COV-2) (CORONAVIRUS DISEASE [COVID-19]), AMPLIFIED PROBE TECHNIQUE, MAKING USE OF HIGH THROUGHPUT TECHNOLOGIES AS DESCRIBED BY CMS-2020-01-R: HCPCS

## 2021-12-05 PROCEDURE — G0463 HOSPITAL OUTPT CLINIC VISIT: HCPCS

## 2021-12-05 PROCEDURE — 99203 OFFICE O/P NEW LOW 30 MIN: CPT

## 2021-12-05 PROCEDURE — U0005 INFEC AGEN DETEC AMPLI PROBE: HCPCS

## 2021-12-05 PROCEDURE — 87880 STREP A ASSAY W/OPTIC: CPT

## 2021-12-05 PROCEDURE — C9803 HOPD COVID-19 SPEC COLLECT: HCPCS

## 2021-12-07 ENCOUNTER — APPOINTMENT (OUTPATIENT)
Dept: GENERAL RADIOLOGY | Facility: HOSPITAL | Age: 61
End: 2021-12-07

## 2021-12-21 ENCOUNTER — APPOINTMENT (OUTPATIENT)
Dept: GENERAL RADIOLOGY | Facility: HOSPITAL | Age: 61
End: 2021-12-21

## 2022-01-04 ENCOUNTER — HOSPITAL ENCOUNTER (OUTPATIENT)
Dept: CT IMAGING | Facility: HOSPITAL | Age: 62
Discharge: HOME | End: 2022-01-04

## 2022-01-04 ENCOUNTER — HOSPITAL ENCOUNTER (OUTPATIENT)
Dept: GENERAL RADIOLOGY | Facility: HOSPITAL | Age: 62
Discharge: HOME | End: 2022-01-04

## 2022-01-04 VITALS
BODY MASS INDEX: 38.76 KG/M2 | DIASTOLIC BLOOD PRESSURE: 73 MMHG | HEART RATE: 87 BPM | OXYGEN SATURATION: 98 % | HEIGHT: 64 IN | SYSTOLIC BLOOD PRESSURE: 150 MMHG | RESPIRATION RATE: 20 BRPM | WEIGHT: 227 LBS | TEMPERATURE: 97 F

## 2022-01-04 DIAGNOSIS — M48.062 SPINAL STENOSIS, LUMBAR REGION, WITH NEUROGENIC CLAUDICATION: ICD-10-CM

## 2022-01-04 DIAGNOSIS — M48.062 SPINAL STENOSIS, LUMBAR REGION WITH NEUROGENIC CLAUDICATION: ICD-10-CM

## 2022-01-04 PROCEDURE — 62284 INJECTION FOR MYELOGRAM: CPT | Performed by: NEUROLOGICAL SURGERY

## 2022-01-04 PROCEDURE — 72120 X-RAY BEND ONLY L-S SPINE: CPT

## 2022-01-04 PROCEDURE — 72240 MYELOGRAPHY NECK SPINE: CPT

## 2022-01-04 PROCEDURE — 62304 MYELOGRAPHY LUMBAR INJECTION: CPT

## 2022-01-04 PROCEDURE — 72132 CT LUMBAR SPINE W/DYE: CPT

## 2022-01-04 PROCEDURE — 0 IOPAMIDOL 41 % SOLUTION: Performed by: NEUROLOGICAL SURGERY

## 2022-01-04 RX ORDER — OMEPRAZOLE 20 MG/1
20 CAPSULE, DELAYED RELEASE ORAL DAILY
COMMUNITY

## 2022-01-04 RX ORDER — ONDANSETRON 4 MG/1
4 TABLET, FILM COATED ORAL ONCE AS NEEDED
Status: DISCONTINUED | OUTPATIENT
Start: 2022-01-04 | End: 2022-01-05 | Stop reason: HOSPADM

## 2022-01-04 RX ORDER — PROMETHAZINE HYDROCHLORIDE 25 MG/1
25 TABLET ORAL ONCE AS NEEDED
Status: DISCONTINUED | OUTPATIENT
Start: 2022-01-04 | End: 2022-01-05 | Stop reason: HOSPADM

## 2022-01-04 RX ORDER — LIDOCAINE HYDROCHLORIDE 10 MG/ML
5 INJECTION, SOLUTION EPIDURAL; INFILTRATION; INTRACAUDAL; PERINEURAL ONCE
Status: COMPLETED | OUTPATIENT
Start: 2022-01-04 | End: 2022-01-04

## 2022-01-04 RX ADMIN — IOPAMIDOL 20 ML: 408 INJECTION, SOLUTION INTRATHECAL at 07:12

## 2022-01-04 RX ADMIN — LIDOCAINE HYDROCHLORIDE 5 ML: 10 INJECTION, SOLUTION EPIDURAL; INFILTRATION; INTRACAUDAL; PERINEURAL at 07:10

## 2022-01-04 NOTE — POST-PROCEDURE NOTE
MYELOGRAM PROCEDURE NOTE  Neurosurgery    Patient: Dayana Franks  : 1960      PreOp Diagnosis: Lumbar stenosis with neurogenic claudication    PostOp Diagnosis: Same    Procedure: Lumbar myelogram    Surgeon: Rickey    Anesthesia: 1% lidocaine    Technique:   Spinal needle: 22 gauge   Contrast: Isovue 200 (20ml)   Injection site: Midline L4-5    EBL: Trace    Specimens removed: None    Complication: None        Yehuda Lang MD  22  07:16 EST

## 2022-01-05 ENCOUNTER — TELEPHONE (OUTPATIENT)
Dept: INFUSION THERAPY | Facility: HOSPITAL | Age: 62
End: 2022-01-05

## 2022-01-05 ENCOUNTER — APPOINTMENT (OUTPATIENT)
Dept: GENERAL RADIOLOGY | Facility: HOSPITAL | Age: 62
End: 2022-01-05

## 2022-01-24 ENCOUNTER — HOSPITAL ENCOUNTER (OUTPATIENT)
Age: 62
End: 2022-01-24
Payer: COMMERCIAL

## 2022-01-24 DIAGNOSIS — E07.9: Primary | ICD-10-CM

## 2022-01-24 DIAGNOSIS — E55.9: ICD-10-CM

## 2022-01-24 LAB
25-OH VITAMIN D, TOTAL: 30.1 NG/ML (ref 30–100)
ALBUMIN LEVEL: 4.4 G/DL (ref 3.5–5)
ALBUMIN/GLOB SERPL: 1.5 {RATIO} (ref 1.1–1.8)
ALP ISO SERPL-ACNC: 132 U/L (ref 38–126)
ALT SERPLBLD-CCNC: 24 U/L (ref 12–78)
ANION GAP SERPL CALC-SCNC: 15.5 MEQ/L (ref 5–15)
AST SERPL QL: 31 U/L (ref 14–36)
BILIRUBIN,TOTAL: 0.4 MG/DL (ref 0.2–1.3)
BUN SERPL-MCNC: 11 MG/DL (ref 7–17)
CALCIUM SPEC-MCNC: 9.7 MG/DL (ref 8.4–10.2)
CHLORIDE SPEC-SCNC: 97 MMOL/L (ref 98–107)
CHOLEST SPEC-SCNC: 191 MG/DL (ref 140–200)
CO2 SERPL-SCNC: 29 MMOL/L (ref 22–30)
CREAT BLD-SCNC: 0.6 MG/DL (ref 0.52–1.04)
ESTIMATED GLOMERULAR FILT RATE: 102 ML/MIN (ref 60–?)
GFR (AFRICAN AMERICAN): 124 ML/MIN (ref 60–?)
GLOBULIN SER CALC-MCNC: 2.9 G/DL (ref 1.3–3.2)
GLUCOSE: 117 MG/DL (ref 74–100)
HCT VFR BLD CALC: 38.7 % (ref 37–47)
HDLC SERPL-MCNC: 62 MG/DL (ref 40–60)
HGB BLD-MCNC: 12.2 G/DL (ref 12.2–16.2)
MCHC RBC-ENTMCNC: 31.6 G/DL (ref 31.8–35.4)
MCV RBC: 90.8 FL (ref 81–99)
MEAN CORPUSCULAR HEMOGLOBIN: 28.7 PG (ref 27–31.2)
PLATELET # BLD: 473 K/MM3 (ref 142–424)
POTASSIUM: 4.5 MMOL/L (ref 3.5–5.1)
PROT SERPL-MCNC: 7.3 G/DL (ref 6.3–8.2)
RBC # BLD AUTO: 4.26 M/MM3 (ref 4.2–5.4)
SODIUM SPEC-SCNC: 137 MMOL/L (ref 136–145)
T4 FREE SERPL-MCNC: 0.84 NG/DL (ref 0.78–2.19)
TRIGLYCERIDES: 220 MG/DL (ref 30–150)
TSH SERPL-ACNC: 1.19 UIU/ML (ref 0.47–4.68)
WBC # BLD AUTO: 8.8 K/MM3 (ref 4.8–10.8)

## 2022-01-24 PROCEDURE — 86376 MICROSOMAL ANTIBODY EACH: CPT

## 2022-01-24 PROCEDURE — 84443 ASSAY THYROID STIM HORMONE: CPT

## 2022-01-24 PROCEDURE — 82306 VITAMIN D 25 HYDROXY: CPT

## 2022-01-24 PROCEDURE — 85025 COMPLETE CBC W/AUTO DIFF WBC: CPT

## 2022-01-24 PROCEDURE — 80061 LIPID PANEL: CPT

## 2022-01-24 PROCEDURE — 84481 FREE ASSAY (FT-3): CPT

## 2022-01-24 PROCEDURE — 80053 COMPREHEN METABOLIC PANEL: CPT

## 2022-01-24 PROCEDURE — 84439 ASSAY OF FREE THYROXINE: CPT

## 2022-01-26 LAB — T3FREE SERPL-MCNC: 3.3 PG/ML (ref 2–4.4)

## 2022-02-02 ENCOUNTER — HOSPITAL ENCOUNTER (OUTPATIENT)
Age: 62
End: 2022-02-02
Payer: COMMERCIAL

## 2022-02-02 DIAGNOSIS — E07.9: ICD-10-CM

## 2022-02-02 DIAGNOSIS — Z12.31: Primary | ICD-10-CM

## 2022-02-02 PROCEDURE — 76536 US EXAM OF HEAD AND NECK: CPT

## 2022-02-02 PROCEDURE — 77067 SCR MAMMO BI INCL CAD: CPT

## 2022-02-02 PROCEDURE — 77063 BREAST TOMOSYNTHESIS BI: CPT

## 2022-02-09 ENCOUNTER — OFFICE VISIT (OUTPATIENT)
Dept: NEUROSURGERY | Facility: CLINIC | Age: 62
End: 2022-02-09

## 2022-02-09 VITALS — TEMPERATURE: 98.2 F | WEIGHT: 227 LBS | HEIGHT: 64 IN | BODY MASS INDEX: 38.76 KG/M2

## 2022-02-09 DIAGNOSIS — M54.9 MECHANICAL BACK PAIN: Primary | ICD-10-CM

## 2022-02-09 DIAGNOSIS — M51.36 DDD (DEGENERATIVE DISC DISEASE), LUMBAR: ICD-10-CM

## 2022-02-09 PROCEDURE — 99213 OFFICE O/P EST LOW 20 MIN: CPT | Performed by: NEUROLOGICAL SURGERY

## 2022-02-09 NOTE — PROGRESS NOTES
Patient: Dayana Franks  : 1960    Primary Care Provider: Nigel Phan MD    Requesting Provider: As above        History    Chief Complaint: Low back and left thigh pain with walking and standing intolerance.    History of Present Illness: Ms. Franks is seen in follow-up.  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  In 2019 she underwent a Vertiflex procedure with Dr. Richardson.  She has had ongoing low back difficulties and has recently filed for disability.    She has previously described pain in her back that extends down the posterior aspect of both legs all the way to the feet.  Today most of the pain is in her back with a component that involves the left thigh. She describes pain in the left hip and thigh.  Symptoms are worse with bending, stooping, and particularly when walking.  Pain is alleviated when sitting and when laying down.  Historically she has tried physical therapy, epidural injections, and gabapentin.  Recently she has been weaning from gabapentin because it makes her feel foggy.  She denies focal weakness.  No sensory alteration or bowel bladder difficulties.  She mentions standing on her feet all day while at work significantly exacerbates symptoms which is why she is filing for disability.      Review of Systems   Constitutional: Positive for unexpected weight change. Negative for activity change, appetite change, chills, diaphoresis, fatigue and fever.   HENT: Positive for hearing loss. Negative for congestion, dental problem, drooling, ear discharge, ear pain, facial swelling, mouth sores, nosebleeds, postnasal drip, rhinorrhea, sinus pressure, sinus pain, sneezing, sore throat, tinnitus, trouble swallowing and voice change.    Eyes: Positive for visual disturbance. Negative for photophobia, pain, discharge, redness and itching.   Respiratory: Negative for apnea, cough, choking, chest tightness, shortness of breath,  "wheezing and stridor.    Cardiovascular: Negative for chest pain, palpitations and leg swelling.   Gastrointestinal: Negative for abdominal distention, abdominal pain, anal bleeding, blood in stool, constipation, diarrhea, nausea, rectal pain and vomiting.   Endocrine: Negative for cold intolerance, heat intolerance, polydipsia, polyphagia and polyuria.   Genitourinary: Negative for decreased urine volume, difficulty urinating, dyspareunia, dysuria, enuresis, flank pain, frequency, genital sores, hematuria, menstrual problem, pelvic pain, urgency, vaginal bleeding, vaginal discharge and vaginal pain.   Musculoskeletal: Positive for back pain. Negative for arthralgias, gait problem, joint swelling, myalgias, neck pain and neck stiffness.   Skin: Negative for color change, pallor, rash and wound.   Allergic/Immunologic: Negative for environmental allergies, food allergies and immunocompromised state.   Neurological: Negative for dizziness, tremors, seizures, syncope, facial asymmetry, speech difficulty, weakness, light-headedness, numbness and headaches.   Hematological: Negative for adenopathy. Does not bruise/bleed easily.   Psychiatric/Behavioral: Negative for agitation, behavioral problems, confusion, decreased concentration, dysphoric mood, hallucinations, self-injury, sleep disturbance and suicidal ideas. The patient is not nervous/anxious and is not hyperactive.        The patient's past medical history, past surgical history, family history, and social history have been reviewed at length in the electronic medical record.    Physical Exam:   Temp 98.2 °F (36.8 °C)   Ht 162.6 cm (64\")   Wt 103 kg (227 lb)   BMI 38.96 kg/m²   MUSCULOSKELETAL:  Straight leg raising is negative.  Roberto's Sign is negative.l.  Tenderness in the back to palpation is not observed.  NEUROLOGICAL:  Strength is intact in the lower extremities to direct testing.  Muscle tone is normal throughout.  Station and gait are " normal.  Sensation is intact to light touch testing throughout.  Coordination is intact.      Medical Decision Making    Data Review:   (All imaging studies were personally reviewed unless stated otherwise)  Lumbar CT myelogram demonstrates her interspinous spacers at L2-3 and L3-4.  There is mild stenosis at L4-5.    Diagnosis:   1.  Mechanical low back pain.  2.  Lumbar degenerative disc disease.    Treatment Options:   I do not see significant root or canal compromise.  The patient does not require surgical intervention and treatment will need to remain symptomatic.  I do not see anything in terms of her spine that would make her incapable of gainful employment.  She will follow-up as needed.       Diagnosis Plan   1. Mechanical back pain     2. DDD (degenerative disc disease), lumbar         Scribed for Yehuda Lang MD by CHANDANA Draper 2/9/2022 14:58 EST      I, Dr. Lang, personally performed the services described in the documentation, as scribed in my presence, and it is both accurate and complete.

## 2022-02-14 ENCOUNTER — HOSPITAL ENCOUNTER (OUTPATIENT)
Age: 62
End: 2022-02-14
Payer: COMMERCIAL

## 2022-02-14 DIAGNOSIS — R92.8: Primary | ICD-10-CM

## 2022-02-14 PROCEDURE — 77065 DX MAMMO INCL CAD UNI: CPT

## 2022-02-14 PROCEDURE — 77061 BREAST TOMOSYNTHESIS UNI: CPT

## 2022-02-14 PROCEDURE — G0279 TOMOSYNTHESIS, MAMMO: HCPCS

## 2022-03-01 ENCOUNTER — HOSPITAL ENCOUNTER (OUTPATIENT)
Age: 62
End: 2022-03-01
Payer: COMMERCIAL

## 2022-03-01 DIAGNOSIS — R91.8: Primary | ICD-10-CM

## 2022-03-01 PROCEDURE — 71250 CT THORAX DX C-: CPT

## 2022-03-02 ENCOUNTER — HOSPITAL ENCOUNTER (OUTPATIENT)
Age: 62
End: 2022-03-02
Payer: COMMERCIAL

## 2022-03-02 DIAGNOSIS — R92.8: Primary | ICD-10-CM

## 2022-03-02 PROCEDURE — 19081 BX BREAST 1ST LESION STRTCTC: CPT

## 2022-03-02 PROCEDURE — 76098 X-RAY EXAM SURGICAL SPECIMEN: CPT

## 2022-03-02 PROCEDURE — 77065 DX MAMMO INCL CAD UNI: CPT

## 2022-03-03 ENCOUNTER — HOSPITAL ENCOUNTER (OUTPATIENT)
Age: 62
End: 2022-03-03
Payer: COMMERCIAL

## 2022-03-03 VITALS
DIASTOLIC BLOOD PRESSURE: 59 MMHG | RESPIRATION RATE: 20 BRPM | HEART RATE: 64 BPM | OXYGEN SATURATION: 98 % | SYSTOLIC BLOOD PRESSURE: 113 MMHG | TEMPERATURE: 97.5 F

## 2022-03-03 VITALS — BODY MASS INDEX: 37.8 KG/M2

## 2022-03-03 DIAGNOSIS — G89.4: ICD-10-CM

## 2022-03-03 DIAGNOSIS — M51.16: Primary | ICD-10-CM

## 2022-03-03 PROCEDURE — 99212 OFFICE O/P EST SF 10 MIN: CPT

## 2022-03-03 PROCEDURE — G0463 HOSPITAL OUTPT CLINIC VISIT: HCPCS

## 2022-03-11 ENCOUNTER — HOSPITAL ENCOUNTER (EMERGENCY)
Age: 62
Discharge: HOME | End: 2022-03-11
Payer: COMMERCIAL

## 2022-03-11 VITALS
SYSTOLIC BLOOD PRESSURE: 111 MMHG | HEART RATE: 79 BPM | RESPIRATION RATE: 20 BRPM | TEMPERATURE: 98.24 F | DIASTOLIC BLOOD PRESSURE: 73 MMHG | OXYGEN SATURATION: 99 %

## 2022-03-11 VITALS
RESPIRATION RATE: 20 BRPM | DIASTOLIC BLOOD PRESSURE: 73 MMHG | TEMPERATURE: 98.2 F | SYSTOLIC BLOOD PRESSURE: 111 MMHG | OXYGEN SATURATION: 99 % | HEART RATE: 79 BPM

## 2022-03-11 VITALS — BODY MASS INDEX: 37.8 KG/M2

## 2022-03-11 DIAGNOSIS — J32.9: Primary | ICD-10-CM

## 2022-03-11 PROCEDURE — G0463 HOSPITAL OUTPT CLINIC VISIT: HCPCS

## 2022-03-11 PROCEDURE — 99212 OFFICE O/P EST SF 10 MIN: CPT

## 2022-03-16 ENCOUNTER — HOSPITAL ENCOUNTER (OUTPATIENT)
Age: 62
End: 2022-03-16
Payer: COMMERCIAL

## 2022-03-16 DIAGNOSIS — Z11.52: ICD-10-CM

## 2022-03-16 DIAGNOSIS — Z01.818: Primary | ICD-10-CM

## 2022-03-16 DIAGNOSIS — D05.11: ICD-10-CM

## 2022-03-16 LAB
ANION GAP SERPL CALC-SCNC: 11.9 MEQ/L (ref 5–15)
BUN SERPL-MCNC: 20 MG/DL (ref 7–17)
CALCIUM SPEC-MCNC: 9.5 MG/DL (ref 8.4–10.2)
CELLS COUNTED: 100
CHLORIDE SPEC-SCNC: 101 MMOL/L (ref 98–107)
CO2 SERPL-SCNC: 29 MMOL/L (ref 22–30)
CREAT BLD-SCNC: 0.6 MG/DL (ref 0.52–1.04)
ESTIMATED GLOMERULAR FILT RATE: 102 ML/MIN (ref 60–?)
GFR (AFRICAN AMERICAN): 124 ML/MIN (ref 60–?)
GLUCOSE: 110 MG/DL (ref 74–100)
HCT VFR BLD CALC: 38.9 % (ref 37–47)
HGB BLD-MCNC: 12.3 G/DL (ref 12.2–16.2)
MANUAL DIFFERENTIAL: (no result)
MCHC RBC-ENTMCNC: 31.7 G/DL (ref 31.8–35.4)
MCV RBC: 90.1 FL (ref 81–99)
MEAN CORPUSCULAR HEMOGLOBIN: 28.5 PG (ref 27–31.2)
PLATELET # BLD: 504 K/MM3 (ref 142–424)
POTASSIUM: 4.9 MMOL/L (ref 3.5–5.1)
RBC # BLD AUTO: 4.32 M/MM3 (ref 4.2–5.4)
SODIUM SPEC-SCNC: 137 MMOL/L (ref 136–145)
WBC # BLD AUTO: 11.2 K/MM3 (ref 4.8–10.8)

## 2022-03-16 PROCEDURE — 36415 COLL VENOUS BLD VENIPUNCTURE: CPT

## 2022-03-16 PROCEDURE — 85007 BL SMEAR W/DIFF WBC COUNT: CPT

## 2022-03-16 PROCEDURE — 93005 ELECTROCARDIOGRAM TRACING: CPT

## 2022-03-16 PROCEDURE — U0005 INFEC AGEN DETEC AMPLI PROBE: HCPCS

## 2022-03-16 PROCEDURE — C9803 HOPD COVID-19 SPEC COLLECT: HCPCS

## 2022-03-16 PROCEDURE — U0003 INFECTIOUS AGENT DETECTION BY NUCLEIC ACID (DNA OR RNA); SEVERE ACUTE RESPIRATORY SYNDROME CORONAVIRUS 2 (SARS-COV-2) (CORONAVIRUS DISEASE [COVID-19]), AMPLIFIED PROBE TECHNIQUE, MAKING USE OF HIGH THROUGHPUT TECHNOLOGIES AS DESCRIBED BY CMS-2020-01-R: HCPCS

## 2022-03-16 PROCEDURE — 80048 BASIC METABOLIC PNL TOTAL CA: CPT

## 2022-03-16 PROCEDURE — 85025 COMPLETE CBC W/AUTO DIFF WBC: CPT

## 2022-03-18 ENCOUNTER — HOSPITAL ENCOUNTER (OUTPATIENT)
Dept: HOSPITAL 22 - OR | Age: 62
Discharge: HOME | End: 2022-03-18
Payer: COMMERCIAL

## 2022-03-18 VITALS
RESPIRATION RATE: 18 BRPM | OXYGEN SATURATION: 95 % | DIASTOLIC BLOOD PRESSURE: 54 MMHG | HEART RATE: 71 BPM | SYSTOLIC BLOOD PRESSURE: 107 MMHG | TEMPERATURE: 98.1 F

## 2022-03-18 VITALS
DIASTOLIC BLOOD PRESSURE: 75 MMHG | OXYGEN SATURATION: 99 % | SYSTOLIC BLOOD PRESSURE: 133 MMHG | HEART RATE: 70 BPM | RESPIRATION RATE: 18 BRPM

## 2022-03-18 VITALS
HEART RATE: 82 BPM | OXYGEN SATURATION: 99 % | DIASTOLIC BLOOD PRESSURE: 74 MMHG | RESPIRATION RATE: 18 BRPM | SYSTOLIC BLOOD PRESSURE: 130 MMHG

## 2022-03-18 VITALS
TEMPERATURE: 97.3 F | RESPIRATION RATE: 18 BRPM | HEART RATE: 75 BPM | SYSTOLIC BLOOD PRESSURE: 147 MMHG | DIASTOLIC BLOOD PRESSURE: 81 MMHG | OXYGEN SATURATION: 95 %

## 2022-03-18 VITALS
SYSTOLIC BLOOD PRESSURE: 132 MMHG | RESPIRATION RATE: 18 BRPM | OXYGEN SATURATION: 98 % | DIASTOLIC BLOOD PRESSURE: 83 MMHG | HEART RATE: 84 BPM

## 2022-03-18 VITALS — BODY MASS INDEX: 37.8 KG/M2

## 2022-03-18 DIAGNOSIS — D05.11: Primary | ICD-10-CM

## 2022-03-18 PROCEDURE — 96374 THER/PROPH/DIAG INJ IV PUSH: CPT

## 2022-03-18 PROCEDURE — 19281 PERQ DEVICE BREAST 1ST IMAG: CPT

## 2022-03-18 PROCEDURE — 76098 X-RAY EXAM SURGICAL SPECIMEN: CPT

## 2022-03-18 PROCEDURE — 19101 BIOPSY OF BREAST OPEN: CPT

## 2022-04-04 ENCOUNTER — HOSPITAL ENCOUNTER (OUTPATIENT)
Age: 62
End: 2022-04-04
Payer: COMMERCIAL

## 2022-04-04 VITALS — BODY MASS INDEX: 37.8 KG/M2

## 2022-04-04 VITALS
RESPIRATION RATE: 18 BRPM | HEART RATE: 78 BPM | SYSTOLIC BLOOD PRESSURE: 147 MMHG | TEMPERATURE: 97.16 F | DIASTOLIC BLOOD PRESSURE: 76 MMHG | OXYGEN SATURATION: 98 %

## 2022-04-04 DIAGNOSIS — M51.16: Primary | ICD-10-CM

## 2022-04-04 DIAGNOSIS — Z98.890: ICD-10-CM

## 2022-04-04 DIAGNOSIS — M48.062: ICD-10-CM

## 2022-04-04 PROCEDURE — 99212 OFFICE O/P EST SF 10 MIN: CPT

## 2022-04-04 PROCEDURE — G0463 HOSPITAL OUTPT CLINIC VISIT: HCPCS

## 2022-05-05 ENCOUNTER — HOSPITAL ENCOUNTER (OUTPATIENT)
Age: 62
End: 2022-05-05
Payer: COMMERCIAL

## 2022-05-05 VITALS
DIASTOLIC BLOOD PRESSURE: 60 MMHG | TEMPERATURE: 97.52 F | SYSTOLIC BLOOD PRESSURE: 122 MMHG | HEART RATE: 91 BPM | OXYGEN SATURATION: 97 % | RESPIRATION RATE: 18 BRPM

## 2022-05-05 VITALS — BODY MASS INDEX: 37.8 KG/M2

## 2022-05-05 DIAGNOSIS — Z98.890: ICD-10-CM

## 2022-05-05 DIAGNOSIS — M48.062: ICD-10-CM

## 2022-05-05 DIAGNOSIS — M51.16: Primary | ICD-10-CM

## 2022-05-05 PROCEDURE — G0463 HOSPITAL OUTPT CLINIC VISIT: HCPCS

## 2022-05-05 PROCEDURE — 99212 OFFICE O/P EST SF 10 MIN: CPT

## 2022-06-14 ENCOUNTER — HOSPITAL ENCOUNTER (OUTPATIENT)
Age: 62
End: 2022-06-14
Payer: MEDICARE

## 2022-06-14 VITALS — BODY MASS INDEX: 37.8 KG/M2

## 2022-06-14 VITALS
HEART RATE: 79 BPM | OXYGEN SATURATION: 95 % | SYSTOLIC BLOOD PRESSURE: 127 MMHG | DIASTOLIC BLOOD PRESSURE: 72 MMHG | TEMPERATURE: 97.52 F | RESPIRATION RATE: 18 BRPM

## 2022-06-14 DIAGNOSIS — M51.16: Primary | ICD-10-CM

## 2022-06-14 DIAGNOSIS — M19.90: ICD-10-CM

## 2022-06-14 DIAGNOSIS — M48.062: ICD-10-CM

## 2022-06-14 PROCEDURE — G0463 HOSPITAL OUTPT CLINIC VISIT: HCPCS

## 2022-06-14 PROCEDURE — 99212 OFFICE O/P EST SF 10 MIN: CPT

## 2022-08-05 ENCOUNTER — HOSPITAL ENCOUNTER (OUTPATIENT)
Age: 62
End: 2022-08-05
Payer: MEDICARE

## 2022-08-05 DIAGNOSIS — E04.1: Primary | ICD-10-CM

## 2022-08-05 PROCEDURE — 76536 US EXAM OF HEAD AND NECK: CPT

## 2022-08-15 ENCOUNTER — HOSPITAL ENCOUNTER (EMERGENCY)
Age: 62
Discharge: HOME | End: 2022-08-15
Payer: MEDICARE

## 2022-08-15 VITALS
DIASTOLIC BLOOD PRESSURE: 82 MMHG | HEART RATE: 81 BPM | SYSTOLIC BLOOD PRESSURE: 135 MMHG | TEMPERATURE: 99.9 F | OXYGEN SATURATION: 96 % | RESPIRATION RATE: 19 BRPM

## 2022-08-15 VITALS
DIASTOLIC BLOOD PRESSURE: 82 MMHG | RESPIRATION RATE: 19 BRPM | TEMPERATURE: 99.9 F | SYSTOLIC BLOOD PRESSURE: 135 MMHG | OXYGEN SATURATION: 96 % | HEART RATE: 81 BPM

## 2022-08-15 VITALS — BODY MASS INDEX: 36.8 KG/M2

## 2022-08-15 DIAGNOSIS — J32.9: Primary | ICD-10-CM

## 2022-08-15 DIAGNOSIS — Z20.822: ICD-10-CM

## 2022-08-15 PROCEDURE — 87880 STREP A ASSAY W/OPTIC: CPT

## 2022-08-15 PROCEDURE — U0003 INFECTIOUS AGENT DETECTION BY NUCLEIC ACID (DNA OR RNA); SEVERE ACUTE RESPIRATORY SYNDROME CORONAVIRUS 2 (SARS-COV-2) (CORONAVIRUS DISEASE [COVID-19]), AMPLIFIED PROBE TECHNIQUE, MAKING USE OF HIGH THROUGHPUT TECHNOLOGIES AS DESCRIBED BY CMS-2020-01-R: HCPCS

## 2022-08-15 PROCEDURE — 87804 INFLUENZA ASSAY W/OPTIC: CPT

## 2022-08-15 PROCEDURE — C9803 HOPD COVID-19 SPEC COLLECT: HCPCS

## 2022-08-15 PROCEDURE — 99212 OFFICE O/P EST SF 10 MIN: CPT

## 2022-08-15 PROCEDURE — U0005 INFEC AGEN DETEC AMPLI PROBE: HCPCS

## 2022-08-15 PROCEDURE — G0463 HOSPITAL OUTPT CLINIC VISIT: HCPCS

## 2022-08-18 ENCOUNTER — HOSPITAL ENCOUNTER (EMERGENCY)
Age: 62
Discharge: HOME | End: 2022-08-18
Payer: MEDICARE

## 2022-08-18 VITALS
TEMPERATURE: 99 F | RESPIRATION RATE: 15 BRPM | HEART RATE: 109 BPM | DIASTOLIC BLOOD PRESSURE: 68 MMHG | SYSTOLIC BLOOD PRESSURE: 123 MMHG

## 2022-08-18 VITALS
HEART RATE: 109 BPM | OXYGEN SATURATION: 95 % | TEMPERATURE: 101.7 F | DIASTOLIC BLOOD PRESSURE: 68 MMHG | SYSTOLIC BLOOD PRESSURE: 123 MMHG | RESPIRATION RATE: 15 BRPM

## 2022-08-18 VITALS — BODY MASS INDEX: 36.8 KG/M2

## 2022-08-18 DIAGNOSIS — J01.90: Primary | ICD-10-CM

## 2022-08-18 DIAGNOSIS — Z82.49: ICD-10-CM

## 2022-08-18 DIAGNOSIS — I25.119: ICD-10-CM

## 2022-08-18 DIAGNOSIS — R00.2: ICD-10-CM

## 2022-08-18 DIAGNOSIS — Z83.3: ICD-10-CM

## 2022-08-18 DIAGNOSIS — K21.9: ICD-10-CM

## 2022-08-18 DIAGNOSIS — Z80.9: ICD-10-CM

## 2022-08-18 DIAGNOSIS — Z79.1: ICD-10-CM

## 2022-08-18 DIAGNOSIS — R94.39: ICD-10-CM

## 2022-08-18 DIAGNOSIS — Z87.891: ICD-10-CM

## 2022-08-18 DIAGNOSIS — Z83.438: ICD-10-CM

## 2022-08-18 DIAGNOSIS — M19.90: ICD-10-CM

## 2022-08-18 DIAGNOSIS — Z79.51: ICD-10-CM

## 2022-08-18 DIAGNOSIS — J02.9: ICD-10-CM

## 2022-08-18 DIAGNOSIS — J44.9: ICD-10-CM

## 2022-08-18 DIAGNOSIS — J98.4: ICD-10-CM

## 2022-08-18 DIAGNOSIS — Z79.52: ICD-10-CM

## 2022-08-18 DIAGNOSIS — Z20.822: ICD-10-CM

## 2022-08-18 DIAGNOSIS — H92.09: ICD-10-CM

## 2022-08-18 DIAGNOSIS — R42: ICD-10-CM

## 2022-08-18 DIAGNOSIS — Z79.899: ICD-10-CM

## 2022-08-18 DIAGNOSIS — F32.A: ICD-10-CM

## 2022-08-18 DIAGNOSIS — E78.5: ICD-10-CM

## 2022-08-18 DIAGNOSIS — F41.9: ICD-10-CM

## 2022-08-18 DIAGNOSIS — R01.1: ICD-10-CM

## 2022-08-18 DIAGNOSIS — I10: ICD-10-CM

## 2022-08-18 LAB
ANION GAP SERPL CALC-SCNC: 13 MEQ/L (ref 5–15)
BUN SERPL-MCNC: 8 MG/DL (ref 7–17)
CALCIUM SPEC-MCNC: 9.3 MG/DL (ref 8.4–10.2)
CHLORIDE SPEC-SCNC: 96 MMOL/L (ref 98–107)
CO2 SERPL-SCNC: 28 MMOL/L (ref 22–30)
CREAT BLD-SCNC: 0.6 MG/DL (ref 0.52–1.04)
CREATININE CLEARANCE ESTIMATED: 155 ML/MIN (ref 50–200)
ESTIMATED GLOMERULAR FILT RATE: 102 ML/MIN (ref 60–?)
GFR (AFRICAN AMERICAN): 124 ML/MIN (ref 60–?)
GLUCOSE: 152 MG/DL (ref 74–100)
HCT VFR BLD CALC: 34.3 % (ref 37–47)
HGB BLD-MCNC: 11.9 G/DL (ref 12.2–16.2)
MCHC RBC-ENTMCNC: 34.8 G/DL (ref 31.8–35.4)
MCV RBC: 83.7 FL (ref 81–99)
MEAN CORPUSCULAR HEMOGLOBIN: 29.2 PG (ref 27–31.2)
PLATELET # BLD: 391 K/MM3 (ref 142–424)
POTASSIUM: 4 MMOL/L (ref 3.5–5.1)
RBC # BLD AUTO: 4.09 M/MM3 (ref 4.2–5.4)
SODIUM SPEC-SCNC: 133 MMOL/L (ref 136–145)
WBC # BLD AUTO: 9.6 K/MM3 (ref 4.8–10.8)

## 2022-08-18 PROCEDURE — G0463 HOSPITAL OUTPT CLINIC VISIT: HCPCS

## 2022-08-18 PROCEDURE — 87632 RESP VIRUS 6-11 TARGETS: CPT

## 2022-08-18 PROCEDURE — 71046 X-RAY EXAM CHEST 2 VIEWS: CPT

## 2022-08-18 PROCEDURE — 96372 THER/PROPH/DIAG INJ SC/IM: CPT

## 2022-08-18 PROCEDURE — U0003 INFECTIOUS AGENT DETECTION BY NUCLEIC ACID (DNA OR RNA); SEVERE ACUTE RESPIRATORY SYNDROME CORONAVIRUS 2 (SARS-COV-2) (CORONAVIRUS DISEASE [COVID-19]), AMPLIFIED PROBE TECHNIQUE, MAKING USE OF HIGH THROUGHPUT TECHNOLOGIES AS DESCRIBED BY CMS-2020-01-R: HCPCS

## 2022-08-18 PROCEDURE — 87581 M.PNEUMON DNA AMP PROBE: CPT

## 2022-08-18 PROCEDURE — 80048 BASIC METABOLIC PNL TOTAL CA: CPT

## 2022-08-18 PROCEDURE — C9803 HOPD COVID-19 SPEC COLLECT: HCPCS

## 2022-08-18 PROCEDURE — 99213 OFFICE O/P EST LOW 20 MIN: CPT

## 2022-08-18 PROCEDURE — U0005 INFEC AGEN DETEC AMPLI PROBE: HCPCS

## 2022-08-18 PROCEDURE — 85025 COMPLETE CBC W/AUTO DIFF WBC: CPT

## 2022-08-18 PROCEDURE — 87798 DETECT AGENT NOS DNA AMP: CPT

## 2022-08-28 ENCOUNTER — HOSPITAL ENCOUNTER (EMERGENCY)
Age: 62
Discharge: HOME | End: 2022-08-28
Payer: MEDICARE

## 2022-08-28 VITALS
SYSTOLIC BLOOD PRESSURE: 122 MMHG | RESPIRATION RATE: 19 BRPM | HEART RATE: 101 BPM | TEMPERATURE: 98.78 F | OXYGEN SATURATION: 98 % | DIASTOLIC BLOOD PRESSURE: 83 MMHG

## 2022-08-28 VITALS
TEMPERATURE: 98.78 F | DIASTOLIC BLOOD PRESSURE: 83 MMHG | SYSTOLIC BLOOD PRESSURE: 122 MMHG | HEART RATE: 101 BPM | RESPIRATION RATE: 19 BRPM

## 2022-08-28 VITALS — BODY MASS INDEX: 36.8 KG/M2

## 2022-08-28 DIAGNOSIS — R94.31: ICD-10-CM

## 2022-08-28 DIAGNOSIS — R50.9: ICD-10-CM

## 2022-08-28 DIAGNOSIS — J02.9: ICD-10-CM

## 2022-08-28 DIAGNOSIS — K21.9: ICD-10-CM

## 2022-08-28 DIAGNOSIS — B34.9: Primary | ICD-10-CM

## 2022-08-28 DIAGNOSIS — Z20.822: ICD-10-CM

## 2022-08-28 DIAGNOSIS — R00.2: ICD-10-CM

## 2022-08-28 DIAGNOSIS — Z82.49: ICD-10-CM

## 2022-08-28 DIAGNOSIS — R60.0: ICD-10-CM

## 2022-08-28 DIAGNOSIS — I20.9: ICD-10-CM

## 2022-08-28 DIAGNOSIS — I10: ICD-10-CM

## 2022-08-28 DIAGNOSIS — R42: ICD-10-CM

## 2022-08-28 DIAGNOSIS — Z87.891: ICD-10-CM

## 2022-08-28 DIAGNOSIS — J44.9: ICD-10-CM

## 2022-08-28 DIAGNOSIS — E78.5: ICD-10-CM

## 2022-08-28 LAB
ANION GAP SERPL CALC-SCNC: 13.7 MEQ/L (ref 5–15)
BUN SERPL-MCNC: 6 MG/DL (ref 7–17)
CALCIUM SPEC-MCNC: 9.3 MG/DL (ref 8.4–10.2)
CHLORIDE SPEC-SCNC: 100 MMOL/L (ref 98–107)
CO2 SERPL-SCNC: 28 MMOL/L (ref 22–30)
CREAT BLD-SCNC: 0.6 MG/DL (ref 0.52–1.04)
CREATININE CLEARANCE ESTIMATED: 154 ML/MIN (ref 50–200)
ESTIMATED GLOMERULAR FILT RATE: 102 ML/MIN (ref 60–?)
GFR (AFRICAN AMERICAN): 123 ML/MIN (ref 60–?)
GLUCOSE: 152 MG/DL (ref 74–100)
HCT VFR BLD CALC: 36.9 % (ref 37–47)
HGB BLD-MCNC: 11.7 G/DL (ref 12.2–16.2)
MCHC RBC-ENTMCNC: 31.8 G/DL (ref 31.8–35.4)
MCV RBC: 86.7 FL (ref 81–99)
MEAN CORPUSCULAR HEMOGLOBIN: 27.6 PG (ref 27–31.2)
PLATELET # BLD: 600 K/MM3 (ref 142–424)
POTASSIUM: 4.7 MMOL/L (ref 3.5–5.1)
RBC # BLD AUTO: 4.26 M/MM3 (ref 4.2–5.4)
SODIUM SPEC-SCNC: 137 MMOL/L (ref 136–145)
WBC # BLD AUTO: 10.3 K/MM3 (ref 4.8–10.8)

## 2022-08-28 PROCEDURE — 86318 IA INFECTIOUS AGENT ANTIBODY: CPT

## 2022-08-28 PROCEDURE — 87632 RESP VIRUS 6-11 TARGETS: CPT

## 2022-08-28 PROCEDURE — 99213 OFFICE O/P EST LOW 20 MIN: CPT

## 2022-08-28 PROCEDURE — 87804 INFLUENZA ASSAY W/OPTIC: CPT

## 2022-08-28 PROCEDURE — C9803 HOPD COVID-19 SPEC COLLECT: HCPCS

## 2022-08-28 PROCEDURE — U0003 INFECTIOUS AGENT DETECTION BY NUCLEIC ACID (DNA OR RNA); SEVERE ACUTE RESPIRATORY SYNDROME CORONAVIRUS 2 (SARS-COV-2) (CORONAVIRUS DISEASE [COVID-19]), AMPLIFIED PROBE TECHNIQUE, MAKING USE OF HIGH THROUGHPUT TECHNOLOGIES AS DESCRIBED BY CMS-2020-01-R: HCPCS

## 2022-08-28 PROCEDURE — 80048 BASIC METABOLIC PNL TOTAL CA: CPT

## 2022-08-28 PROCEDURE — 87880 STREP A ASSAY W/OPTIC: CPT

## 2022-08-28 PROCEDURE — 87581 M.PNEUMON DNA AMP PROBE: CPT

## 2022-08-28 PROCEDURE — G0463 HOSPITAL OUTPT CLINIC VISIT: HCPCS

## 2022-08-28 PROCEDURE — U0005 INFEC AGEN DETEC AMPLI PROBE: HCPCS

## 2022-08-28 PROCEDURE — 87798 DETECT AGENT NOS DNA AMP: CPT

## 2022-08-28 PROCEDURE — 71046 X-RAY EXAM CHEST 2 VIEWS: CPT

## 2022-08-28 PROCEDURE — 85025 COMPLETE CBC W/AUTO DIFF WBC: CPT

## 2022-09-10 ENCOUNTER — HOSPITAL ENCOUNTER (OUTPATIENT)
Age: 62
End: 2022-09-10
Payer: MEDICARE

## 2022-09-10 DIAGNOSIS — Z01.818: ICD-10-CM

## 2022-09-10 DIAGNOSIS — D49.2: Primary | ICD-10-CM

## 2022-09-10 DIAGNOSIS — Z20.822: ICD-10-CM

## 2022-09-10 LAB
ALBUMIN LEVEL: 3.7 G/DL (ref 3.5–5)
ALBUMIN/GLOB SERPL: 1.4 {RATIO} (ref 1.1–1.8)
ALP ISO SERPL-ACNC: 157 U/L (ref 38–126)
ALT SERPLBLD-CCNC: 26 U/L (ref 12–78)
ANION GAP SERPL CALC-SCNC: 11.9 MEQ/L (ref 5–15)
AST SERPL QL: 29 U/L (ref 14–36)
BILIRUBIN,TOTAL: 0.1 MG/DL (ref 0.2–1.3)
BUN SERPL-MCNC: 8 MG/DL (ref 7–17)
CALCIUM SPEC-MCNC: 8.8 MG/DL (ref 8.4–10.2)
CELLS COUNTED: 100
CHLORIDE SPEC-SCNC: 101 MMOL/L (ref 98–107)
CO2 SERPL-SCNC: 26 MMOL/L (ref 22–30)
CREAT BLD-SCNC: 0.6 MG/DL (ref 0.52–1.04)
ESTIMATED GLOMERULAR FILT RATE: 102 ML/MIN (ref 60–?)
GFR (AFRICAN AMERICAN): 123 ML/MIN (ref 60–?)
GLOBULIN SER CALC-MCNC: 2.7 G/DL (ref 1.3–3.2)
GLUCOSE: 266 MG/DL (ref 74–100)
HCT VFR BLD CALC: 35.5 % (ref 37–47)
HGB BLD-MCNC: 11.4 G/DL (ref 12.2–16.2)
MANUAL DIFFERENTIAL: (no result)
MCHC RBC-ENTMCNC: 32.1 G/DL (ref 31.8–35.4)
MCV RBC: 87.4 FL (ref 81–99)
MEAN CORPUSCULAR HEMOGLOBIN: 28 PG (ref 27–31.2)
PLATELET # BLD: 423 K/MM3 (ref 142–424)
POTASSIUM: 3.9 MMOL/L (ref 3.5–5.1)
PROT SERPL-MCNC: 6.4 G/DL (ref 6.3–8.2)
RBC # BLD AUTO: 4.06 M/MM3 (ref 4.2–5.4)
SODIUM SPEC-SCNC: 135 MMOL/L (ref 136–145)
WBC # BLD AUTO: 7.8 K/MM3 (ref 4.8–10.8)

## 2022-09-10 PROCEDURE — 80053 COMPREHEN METABOLIC PANEL: CPT

## 2022-09-10 PROCEDURE — 85049 AUTOMATED PLATELET COUNT: CPT

## 2022-09-10 PROCEDURE — 36415 COLL VENOUS BLD VENIPUNCTURE: CPT

## 2022-09-10 PROCEDURE — U0005 INFEC AGEN DETEC AMPLI PROBE: HCPCS

## 2022-09-10 PROCEDURE — 85048 AUTOMATED LEUKOCYTE COUNT: CPT

## 2022-09-10 PROCEDURE — 85018 HEMOGLOBIN: CPT

## 2022-09-10 PROCEDURE — U0003 INFECTIOUS AGENT DETECTION BY NUCLEIC ACID (DNA OR RNA); SEVERE ACUTE RESPIRATORY SYNDROME CORONAVIRUS 2 (SARS-COV-2) (CORONAVIRUS DISEASE [COVID-19]), AMPLIFIED PROBE TECHNIQUE, MAKING USE OF HIGH THROUGHPUT TECHNOLOGIES AS DESCRIBED BY CMS-2020-01-R: HCPCS

## 2022-09-10 PROCEDURE — 93005 ELECTROCARDIOGRAM TRACING: CPT

## 2022-09-10 PROCEDURE — 85007 BL SMEAR W/DIFF WBC COUNT: CPT

## 2022-09-10 PROCEDURE — 85014 HEMATOCRIT: CPT

## 2022-09-10 PROCEDURE — C9803 HOPD COVID-19 SPEC COLLECT: HCPCS

## 2022-09-13 ENCOUNTER — HOSPITAL ENCOUNTER (EMERGENCY)
Age: 62
Discharge: HOME | End: 2022-09-13
Payer: MEDICARE

## 2022-09-13 VITALS
SYSTOLIC BLOOD PRESSURE: 124 MMHG | TEMPERATURE: 99.2 F | HEART RATE: 76 BPM | RESPIRATION RATE: 19 BRPM | OXYGEN SATURATION: 99 % | DIASTOLIC BLOOD PRESSURE: 83 MMHG

## 2022-09-13 VITALS
OXYGEN SATURATION: 99 % | TEMPERATURE: 99.2 F | DIASTOLIC BLOOD PRESSURE: 83 MMHG | RESPIRATION RATE: 19 BRPM | SYSTOLIC BLOOD PRESSURE: 124 MMHG | HEART RATE: 76 BPM

## 2022-09-13 VITALS — BODY MASS INDEX: 35.7 KG/M2

## 2022-09-13 DIAGNOSIS — R50.9: ICD-10-CM

## 2022-09-13 DIAGNOSIS — J30.9: Primary | ICD-10-CM

## 2022-09-13 DIAGNOSIS — J02.9: ICD-10-CM

## 2022-09-13 DIAGNOSIS — M79.10: ICD-10-CM

## 2022-09-13 DIAGNOSIS — H92.02: ICD-10-CM

## 2022-09-13 DIAGNOSIS — Z20.822: ICD-10-CM

## 2022-09-13 DIAGNOSIS — R51.9: ICD-10-CM

## 2022-09-13 PROCEDURE — 99212 OFFICE O/P EST SF 10 MIN: CPT

## 2022-09-13 PROCEDURE — 87880 STREP A ASSAY W/OPTIC: CPT

## 2022-09-13 PROCEDURE — U0003 INFECTIOUS AGENT DETECTION BY NUCLEIC ACID (DNA OR RNA); SEVERE ACUTE RESPIRATORY SYNDROME CORONAVIRUS 2 (SARS-COV-2) (CORONAVIRUS DISEASE [COVID-19]), AMPLIFIED PROBE TECHNIQUE, MAKING USE OF HIGH THROUGHPUT TECHNOLOGIES AS DESCRIBED BY CMS-2020-01-R: HCPCS

## 2022-09-13 PROCEDURE — 96372 THER/PROPH/DIAG INJ SC/IM: CPT

## 2022-09-13 PROCEDURE — 87804 INFLUENZA ASSAY W/OPTIC: CPT

## 2022-09-13 PROCEDURE — C9803 HOPD COVID-19 SPEC COLLECT: HCPCS

## 2022-09-13 PROCEDURE — G0463 HOSPITAL OUTPT CLINIC VISIT: HCPCS

## 2022-09-13 PROCEDURE — U0005 INFEC AGEN DETEC AMPLI PROBE: HCPCS

## 2022-10-07 LAB
25-OH VITAMIN D, TOTAL: 30.6 NG/ML (ref 30–100)
ALBUMIN LEVEL: 4 G/DL (ref 3.5–5)
ALBUMIN/GLOB SERPL: 1.3 {RATIO} (ref 1.1–1.8)
ALP ISO SERPL-ACNC: 181 U/L (ref 38–126)
ALT SERPLBLD-CCNC: 27 U/L (ref 12–78)
ANION GAP SERPL CALC-SCNC: 17.2 MEQ/L (ref 5–15)
AST SERPL QL: 32 U/L (ref 14–36)
BILIRUBIN,TOTAL: 0.2 MG/DL (ref 0.2–1.3)
BUN SERPL-MCNC: 8 MG/DL (ref 7–17)
CALCIUM SPEC-MCNC: 9.1 MG/DL (ref 8.4–10.2)
CHLORIDE SPEC-SCNC: 97 MMOL/L (ref 98–107)
CHOLEST SPEC-SCNC: 188 MG/DL (ref 140–200)
CO2 SERPL-SCNC: 26 MMOL/L (ref 22–30)
CREAT BLD-SCNC: 0.5 MG/DL (ref 0.52–1.04)
ESTIMATED GLOMERULAR FILT RATE: 126 ML/MIN (ref 60–?)
GFR (AFRICAN AMERICAN): 152 ML/MIN (ref 60–?)
GLOBULIN SER CALC-MCNC: 3.1 G/DL (ref 1.3–3.2)
GLUCOSE: 120 MG/DL (ref 74–100)
HCT VFR BLD CALC: 36.5 % (ref 37–47)
HDLC SERPL-MCNC: 63 MG/DL (ref 40–60)
HGB BLD-MCNC: 12 G/DL (ref 12.2–16.2)
MCHC RBC-ENTMCNC: 32.8 G/DL (ref 31.8–35.4)
MCV RBC: 84 FL (ref 81–99)
MEAN CORPUSCULAR HEMOGLOBIN: 27.5 PG (ref 27–31.2)
PLATELET # BLD: 450 K/MM3 (ref 142–424)
POTASSIUM: 4.2 MMOL/L (ref 3.5–5.1)
PROT SERPL-MCNC: 7.1 G/DL (ref 6.3–8.2)
RBC # BLD AUTO: 4.34 M/MM3 (ref 4.2–5.4)
SODIUM SPEC-SCNC: 136 MMOL/L (ref 136–145)
T4 FREE SERPL-MCNC: 0.72 NG/DL (ref 0.78–2.19)
TRIGLYCERIDES: 247 MG/DL (ref 30–150)
TSH SERPL-ACNC: 0.81 UIU/ML (ref 0.47–4.68)
WBC # BLD AUTO: 7 K/MM3 (ref 4.8–10.8)

## 2022-10-08 ENCOUNTER — HOSPITAL ENCOUNTER (OUTPATIENT)
Age: 62
End: 2022-10-08
Payer: MEDICARE

## 2022-10-08 DIAGNOSIS — R53.83: ICD-10-CM

## 2022-10-08 DIAGNOSIS — E55.9: ICD-10-CM

## 2022-10-08 DIAGNOSIS — E66.01: Primary | ICD-10-CM

## 2022-10-08 PROCEDURE — 85025 COMPLETE CBC W/AUTO DIFF WBC: CPT

## 2022-10-08 PROCEDURE — 84439 ASSAY OF FREE THYROXINE: CPT

## 2022-10-08 PROCEDURE — 84443 ASSAY THYROID STIM HORMONE: CPT

## 2022-10-08 PROCEDURE — 80053 COMPREHEN METABOLIC PANEL: CPT

## 2022-10-08 PROCEDURE — 80061 LIPID PANEL: CPT

## 2022-10-08 PROCEDURE — 82306 VITAMIN D 25 HYDROXY: CPT

## 2022-10-26 ENCOUNTER — HOSPITAL ENCOUNTER (OUTPATIENT)
Age: 62
End: 2022-10-26
Payer: MEDICARE

## 2022-10-26 DIAGNOSIS — J31.0: Primary | ICD-10-CM

## 2022-10-26 PROCEDURE — 70486 CT MAXILLOFACIAL W/O DYE: CPT

## 2022-11-14 ENCOUNTER — HOSPITAL ENCOUNTER (OUTPATIENT)
Age: 62
End: 2022-11-14
Payer: MEDICARE

## 2022-11-14 DIAGNOSIS — R60.9: Primary | ICD-10-CM

## 2022-11-14 DIAGNOSIS — R59.9: ICD-10-CM

## 2022-11-14 DIAGNOSIS — L98.9: ICD-10-CM

## 2022-11-14 LAB
ALBUMIN LEVEL: 4.1 G/DL (ref 3.5–5)
ALBUMIN/GLOB SERPL: 1.5 {RATIO} (ref 1.1–1.8)
ALP ISO SERPL-ACNC: 146 U/L (ref 38–126)
ALT SERPLBLD-CCNC: 46 U/L (ref 12–78)
ANION GAP SERPL CALC-SCNC: 15.6 MEQ/L (ref 5–15)
AST SERPL QL: 45 U/L (ref 14–36)
BILIRUBIN,TOTAL: 0.2 MG/DL (ref 0.2–1.3)
BUN SERPL-MCNC: 12 MG/DL (ref 7–17)
CALCIUM SPEC-MCNC: 9.8 MG/DL (ref 8.4–10.2)
CHLORIDE SPEC-SCNC: 100 MMOL/L (ref 98–107)
CO2 SERPL-SCNC: 30 MMOL/L (ref 22–30)
CREAT BLD-SCNC: 0.6 MG/DL (ref 0.52–1.04)
ESTIMATED GLOMERULAR FILT RATE: 102 ML/MIN (ref 60–?)
GFR (AFRICAN AMERICAN): 123 ML/MIN (ref 60–?)
GLOBULIN SER CALC-MCNC: 2.8 G/DL (ref 1.3–3.2)
GLUCOSE: 111 MG/DL (ref 74–100)
HCT VFR BLD CALC: 37 % (ref 37–47)
HGB BLD-MCNC: 11.7 G/DL (ref 12.2–16.2)
MCHC RBC-ENTMCNC: 31.7 G/DL (ref 31.8–35.4)
MCV RBC: 85.7 FL (ref 81–99)
MEAN CORPUSCULAR HEMOGLOBIN: 27.1 PG (ref 27–31.2)
PLATELET # BLD: 475 K/MM3 (ref 142–424)
POTASSIUM: 4.6 MMOL/L (ref 3.5–5.1)
PROT SERPL-MCNC: 6.9 G/DL (ref 6.3–8.2)
RBC # BLD AUTO: 4.32 M/MM3 (ref 4.2–5.4)
SODIUM SPEC-SCNC: 141 MMOL/L (ref 136–145)
T4 FREE SERPL-MCNC: 0.88 NG/DL (ref 0.78–2.19)
TSH SERPL-ACNC: < 0.02 UIU/ML (ref 0.47–4.68)
WBC # BLD AUTO: 7.3 K/MM3 (ref 4.8–10.8)

## 2022-11-14 PROCEDURE — 80053 COMPREHEN METABOLIC PANEL: CPT

## 2022-11-14 PROCEDURE — 84443 ASSAY THYROID STIM HORMONE: CPT

## 2022-11-14 PROCEDURE — 93005 ELECTROCARDIOGRAM TRACING: CPT

## 2022-11-14 PROCEDURE — 36415 COLL VENOUS BLD VENIPUNCTURE: CPT

## 2022-11-14 PROCEDURE — 84439 ASSAY OF FREE THYROXINE: CPT

## 2022-11-14 PROCEDURE — 85025 COMPLETE CBC W/AUTO DIFF WBC: CPT

## 2022-11-16 ENCOUNTER — HOSPITAL ENCOUNTER (OUTPATIENT)
Age: 62
End: 2022-11-16
Payer: MEDICARE

## 2022-11-16 DIAGNOSIS — R05.9: Primary | ICD-10-CM

## 2022-11-16 PROCEDURE — 71046 X-RAY EXAM CHEST 2 VIEWS: CPT

## 2022-11-28 ENCOUNTER — HOSPITAL ENCOUNTER (OUTPATIENT)
Age: 62
Discharge: HOME | End: 2022-11-28
Payer: MEDICARE

## 2022-11-28 VITALS
RESPIRATION RATE: 18 BRPM | TEMPERATURE: 97.52 F | DIASTOLIC BLOOD PRESSURE: 74 MMHG | SYSTOLIC BLOOD PRESSURE: 134 MMHG | OXYGEN SATURATION: 95 % | HEART RATE: 80 BPM

## 2022-11-28 VITALS — BODY MASS INDEX: 36.6 KG/M2

## 2022-11-28 VITALS
HEART RATE: 74 BPM | DIASTOLIC BLOOD PRESSURE: 73 MMHG | SYSTOLIC BLOOD PRESSURE: 113 MMHG | RESPIRATION RATE: 16 BRPM | OXYGEN SATURATION: 97 %

## 2022-11-28 VITALS
HEART RATE: 82 BPM | DIASTOLIC BLOOD PRESSURE: 56 MMHG | OXYGEN SATURATION: 97 % | RESPIRATION RATE: 16 BRPM | SYSTOLIC BLOOD PRESSURE: 106 MMHG | TEMPERATURE: 97.34 F

## 2022-11-28 VITALS
RESPIRATION RATE: 16 BRPM | SYSTOLIC BLOOD PRESSURE: 106 MMHG | HEART RATE: 81 BPM | DIASTOLIC BLOOD PRESSURE: 53 MMHG | OXYGEN SATURATION: 96 %

## 2022-11-28 VITALS
RESPIRATION RATE: 16 BRPM | DIASTOLIC BLOOD PRESSURE: 56 MMHG | OXYGEN SATURATION: 97 % | SYSTOLIC BLOOD PRESSURE: 115 MMHG | HEART RATE: 82 BPM

## 2022-11-28 DIAGNOSIS — L82.0: ICD-10-CM

## 2022-11-28 DIAGNOSIS — L98.9: ICD-10-CM

## 2022-11-28 DIAGNOSIS — Z79.899: ICD-10-CM

## 2022-11-28 PROCEDURE — 88305 TISSUE EXAM BY PATHOLOGIST: CPT

## 2022-11-28 PROCEDURE — 96372 THER/PROPH/DIAG INJ SC/IM: CPT

## 2022-11-28 PROCEDURE — 13121 CMPLX RPR S/A/L 2.6-7.5 CM: CPT

## 2022-11-28 PROCEDURE — 11423 EXC H-F-NK-SP B9+MARG 2.1-3: CPT

## 2022-12-13 ENCOUNTER — HOSPITAL ENCOUNTER (OUTPATIENT)
Age: 62
End: 2022-12-13
Payer: MEDICARE

## 2022-12-13 ENCOUNTER — HOSPITAL ENCOUNTER (OUTPATIENT)
Age: 62
Discharge: HOME | End: 2022-12-13
Payer: MEDICARE

## 2022-12-13 VITALS — BODY MASS INDEX: 36.6 KG/M2

## 2022-12-13 DIAGNOSIS — M51.16: Primary | ICD-10-CM

## 2022-12-13 DIAGNOSIS — M48.062: ICD-10-CM

## 2022-12-13 PROCEDURE — G0463 HOSPITAL OUTPT CLINIC VISIT: HCPCS

## 2022-12-13 PROCEDURE — 99212 OFFICE O/P EST SF 10 MIN: CPT

## 2023-02-13 ENCOUNTER — HOSPITAL ENCOUNTER (EMERGENCY)
Age: 63
Discharge: HOME | End: 2023-02-13
Payer: MEDICARE

## 2023-02-13 VITALS
TEMPERATURE: 98.24 F | HEART RATE: 69 BPM | OXYGEN SATURATION: 98 % | RESPIRATION RATE: 19 BRPM | DIASTOLIC BLOOD PRESSURE: 73 MMHG | SYSTOLIC BLOOD PRESSURE: 119 MMHG

## 2023-02-13 VITALS — BODY MASS INDEX: 36.6 KG/M2

## 2023-02-13 VITALS
SYSTOLIC BLOOD PRESSURE: 119 MMHG | DIASTOLIC BLOOD PRESSURE: 73 MMHG | OXYGEN SATURATION: 98 % | TEMPERATURE: 98.24 F | RESPIRATION RATE: 19 BRPM | HEART RATE: 69 BPM

## 2023-02-13 DIAGNOSIS — J32.9: Primary | ICD-10-CM

## 2023-02-13 PROCEDURE — U0005 INFEC AGEN DETEC AMPLI PROBE: HCPCS

## 2023-02-13 PROCEDURE — 99213 OFFICE O/P EST LOW 20 MIN: CPT

## 2023-02-13 PROCEDURE — U0003 INFECTIOUS AGENT DETECTION BY NUCLEIC ACID (DNA OR RNA); SEVERE ACUTE RESPIRATORY SYNDROME CORONAVIRUS 2 (SARS-COV-2) (CORONAVIRUS DISEASE [COVID-19]), AMPLIFIED PROBE TECHNIQUE, MAKING USE OF HIGH THROUGHPUT TECHNOLOGIES AS DESCRIBED BY CMS-2020-01-R: HCPCS

## 2023-02-13 PROCEDURE — C9803 HOPD COVID-19 SPEC COLLECT: HCPCS

## 2023-02-13 PROCEDURE — 99212 OFFICE O/P EST SF 10 MIN: CPT

## 2023-02-13 PROCEDURE — G0463 HOSPITAL OUTPT CLINIC VISIT: HCPCS

## 2023-04-10 ENCOUNTER — HOSPITAL ENCOUNTER (OUTPATIENT)
Age: 63
End: 2023-04-10
Payer: MEDICARE

## 2023-04-10 VITALS — HEART RATE: 96 BPM

## 2023-04-10 DIAGNOSIS — Z87.891: Primary | ICD-10-CM

## 2023-04-10 DIAGNOSIS — Z12.2: ICD-10-CM

## 2023-04-10 DIAGNOSIS — R06.00: ICD-10-CM

## 2023-04-10 PROCEDURE — 94060 EVALUATION OF WHEEZING: CPT

## 2023-04-10 PROCEDURE — 94727 GAS DIL/WSHOT DETER LNG VOL: CPT

## 2023-04-10 PROCEDURE — 94729 DIFFUSING CAPACITY: CPT

## 2023-04-10 PROCEDURE — 94640 AIRWAY INHALATION TREATMENT: CPT

## 2023-04-10 PROCEDURE — 94618 PULMONARY STRESS TESTING: CPT

## 2023-04-10 PROCEDURE — 71271 CT THORAX LUNG CANCER SCR C-: CPT

## 2023-04-17 ENCOUNTER — HOSPITAL ENCOUNTER (OUTPATIENT)
Age: 63
End: 2023-04-17
Payer: MEDICARE

## 2023-04-17 DIAGNOSIS — Z12.31: Primary | ICD-10-CM

## 2023-04-17 PROCEDURE — 77067 SCR MAMMO BI INCL CAD: CPT

## 2023-04-17 PROCEDURE — 77063 BREAST TOMOSYNTHESIS BI: CPT

## 2023-05-09 ENCOUNTER — HOSPITAL ENCOUNTER (OUTPATIENT)
Age: 63
Discharge: HOME | End: 2023-05-09
Payer: MEDICARE

## 2023-05-09 VITALS
SYSTOLIC BLOOD PRESSURE: 118 MMHG | HEART RATE: 77 BPM | OXYGEN SATURATION: 92 % | DIASTOLIC BLOOD PRESSURE: 59 MMHG | TEMPERATURE: 97.6 F | RESPIRATION RATE: 18 BRPM

## 2023-05-09 VITALS
SYSTOLIC BLOOD PRESSURE: 118 MMHG | OXYGEN SATURATION: 94 % | HEART RATE: 77 BPM | RESPIRATION RATE: 18 BRPM | DIASTOLIC BLOOD PRESSURE: 59 MMHG | TEMPERATURE: 97.52 F

## 2023-05-09 VITALS
DIASTOLIC BLOOD PRESSURE: 52 MMHG | RESPIRATION RATE: 16 BRPM | TEMPERATURE: 97.52 F | HEART RATE: 71 BPM | OXYGEN SATURATION: 93 % | SYSTOLIC BLOOD PRESSURE: 93 MMHG

## 2023-05-09 VITALS
HEART RATE: 87 BPM | TEMPERATURE: 98.1 F | SYSTOLIC BLOOD PRESSURE: 147 MMHG | OXYGEN SATURATION: 93 % | RESPIRATION RATE: 18 BRPM | DIASTOLIC BLOOD PRESSURE: 75 MMHG

## 2023-05-09 VITALS
HEART RATE: 82 BPM | RESPIRATION RATE: 16 BRPM | DIASTOLIC BLOOD PRESSURE: 45 MMHG | OXYGEN SATURATION: 93 % | TEMPERATURE: 97.52 F | SYSTOLIC BLOOD PRESSURE: 100 MMHG

## 2023-05-09 VITALS
OXYGEN SATURATION: 94 % | DIASTOLIC BLOOD PRESSURE: 51 MMHG | SYSTOLIC BLOOD PRESSURE: 86 MMHG | HEART RATE: 85 BPM | TEMPERATURE: 97.52 F | RESPIRATION RATE: 18 BRPM

## 2023-05-09 VITALS — OXYGEN SATURATION: 93 %

## 2023-05-09 VITALS — BODY MASS INDEX: 39.4 KG/M2

## 2023-05-09 DIAGNOSIS — K44.9: ICD-10-CM

## 2023-05-09 DIAGNOSIS — Z53.8: ICD-10-CM

## 2023-05-09 DIAGNOSIS — Z86.010: ICD-10-CM

## 2023-05-09 DIAGNOSIS — Z91.199: ICD-10-CM

## 2023-05-09 DIAGNOSIS — K21.9: ICD-10-CM

## 2023-05-09 DIAGNOSIS — K59.00: Primary | ICD-10-CM

## 2023-05-09 PROCEDURE — 88305 TISSUE EXAM BY PATHOLOGIST: CPT

## 2023-05-09 PROCEDURE — 43239 EGD BIOPSY SINGLE/MULTIPLE: CPT

## 2023-05-09 PROCEDURE — 45378 DIAGNOSTIC COLONOSCOPY: CPT

## 2023-05-09 PROCEDURE — 0DJ08ZZ INSPECTION OF UPPER INTESTINAL TRACT, VIA NATURAL OR ARTIFICIAL OPENING ENDOSCOPIC: ICD-10-PCS | Performed by: SURGERY

## 2023-06-26 ENCOUNTER — HOSPITAL ENCOUNTER (OUTPATIENT)
Age: 63
Discharge: HOME | End: 2023-06-26
Payer: MEDICARE

## 2023-06-26 VITALS
DIASTOLIC BLOOD PRESSURE: 56 MMHG | HEART RATE: 74 BPM | RESPIRATION RATE: 18 BRPM | SYSTOLIC BLOOD PRESSURE: 115 MMHG | OXYGEN SATURATION: 97 %

## 2023-06-26 VITALS — BODY MASS INDEX: 37.8 KG/M2

## 2023-06-26 DIAGNOSIS — M51.16: Primary | ICD-10-CM

## 2023-06-26 DIAGNOSIS — M48.062: ICD-10-CM

## 2023-06-26 PROCEDURE — G0463 HOSPITAL OUTPT CLINIC VISIT: HCPCS

## 2023-06-26 PROCEDURE — 99212 OFFICE O/P EST SF 10 MIN: CPT

## 2023-07-12 ENCOUNTER — HOSPITAL ENCOUNTER (OUTPATIENT)
Age: 63
Discharge: HOME | End: 2023-07-12
Payer: MEDICARE

## 2023-07-12 VITALS — BODY MASS INDEX: 55.6 KG/M2

## 2023-07-12 VITALS
DIASTOLIC BLOOD PRESSURE: 77 MMHG | RESPIRATION RATE: 18 BRPM | SYSTOLIC BLOOD PRESSURE: 142 MMHG | OXYGEN SATURATION: 95 % | HEART RATE: 72 BPM | TEMPERATURE: 98.1 F

## 2023-07-12 VITALS
SYSTOLIC BLOOD PRESSURE: 103 MMHG | RESPIRATION RATE: 16 BRPM | DIASTOLIC BLOOD PRESSURE: 63 MMHG | OXYGEN SATURATION: 100 % | HEART RATE: 69 BPM

## 2023-07-12 VITALS
DIASTOLIC BLOOD PRESSURE: 41 MMHG | SYSTOLIC BLOOD PRESSURE: 106 MMHG | RESPIRATION RATE: 16 BRPM | HEART RATE: 66 BPM | OXYGEN SATURATION: 100 %

## 2023-07-12 VITALS
DIASTOLIC BLOOD PRESSURE: 41 MMHG | HEART RATE: 74 BPM | SYSTOLIC BLOOD PRESSURE: 109 MMHG | OXYGEN SATURATION: 95 % | TEMPERATURE: 97.4 F | RESPIRATION RATE: 17 BRPM

## 2023-07-12 VITALS
HEART RATE: 69 BPM | SYSTOLIC BLOOD PRESSURE: 109 MMHG | RESPIRATION RATE: 16 BRPM | OXYGEN SATURATION: 100 % | DIASTOLIC BLOOD PRESSURE: 72 MMHG | TEMPERATURE: 98 F

## 2023-07-12 VITALS — OXYGEN SATURATION: 99 %

## 2023-07-12 DIAGNOSIS — Z12.11: Primary | ICD-10-CM

## 2023-07-12 DIAGNOSIS — K64.8: ICD-10-CM

## 2023-07-12 DIAGNOSIS — D12.3: ICD-10-CM

## 2023-07-12 PROCEDURE — 45380 COLONOSCOPY AND BIOPSY: CPT

## 2023-07-12 PROCEDURE — 88305 TISSUE EXAM BY PATHOLOGIST: CPT

## 2023-07-12 PROCEDURE — 0DJD8ZZ INSPECTION OF LOWER INTESTINAL TRACT, VIA NATURAL OR ARTIFICIAL OPENING ENDOSCOPIC: ICD-10-PCS | Performed by: INTERNAL MEDICINE

## 2023-07-17 ENCOUNTER — HOSPITAL ENCOUNTER (OUTPATIENT)
Age: 63
End: 2023-07-17
Payer: MEDICARE

## 2023-07-17 DIAGNOSIS — E03.9: Primary | ICD-10-CM

## 2023-07-17 PROCEDURE — 76536 US EXAM OF HEAD AND NECK: CPT

## 2023-08-10 ENCOUNTER — HOSPITAL ENCOUNTER (OUTPATIENT)
Age: 63
End: 2023-08-10
Payer: MEDICARE

## 2023-08-10 DIAGNOSIS — E03.9: Primary | ICD-10-CM

## 2023-08-10 LAB
T4 FREE SERPL-MCNC: 0.81 NG/DL (ref 0.78–2.19)
TSH SERPL-ACNC: 0.29 UIU/ML (ref 0.47–4.68)

## 2023-08-10 PROCEDURE — 84443 ASSAY THYROID STIM HORMONE: CPT

## 2023-08-10 PROCEDURE — 36415 COLL VENOUS BLD VENIPUNCTURE: CPT

## 2023-08-10 PROCEDURE — 84439 ASSAY OF FREE THYROXINE: CPT

## 2023-08-25 ENCOUNTER — HOSPITAL ENCOUNTER (OUTPATIENT)
Age: 63
End: 2023-08-25
Payer: MEDICARE

## 2023-08-25 DIAGNOSIS — J32.9: Primary | ICD-10-CM

## 2023-08-25 PROCEDURE — 70486 CT MAXILLOFACIAL W/O DYE: CPT

## 2023-09-14 ENCOUNTER — HOSPITAL ENCOUNTER (OUTPATIENT)
Age: 63
End: 2023-09-14
Payer: MEDICARE

## 2023-09-14 DIAGNOSIS — R42: Primary | ICD-10-CM

## 2023-09-14 DIAGNOSIS — R42: ICD-10-CM

## 2023-09-14 DIAGNOSIS — J32.9: Primary | ICD-10-CM

## 2023-09-14 DIAGNOSIS — R51.9: ICD-10-CM

## 2023-09-14 DIAGNOSIS — E66.9: ICD-10-CM

## 2023-09-14 DIAGNOSIS — I25.10: ICD-10-CM

## 2023-09-14 DIAGNOSIS — R73.03: ICD-10-CM

## 2023-09-14 DIAGNOSIS — E03.9: ICD-10-CM

## 2023-09-14 DIAGNOSIS — Z79.899: ICD-10-CM

## 2023-09-14 LAB
25-OH VITAMIN D, TOTAL: 56.6 NG/ML (ref 30–100)
ALBUMIN LEVEL: 4.5 G/DL (ref 3.5–5)
ALBUMIN/GLOB SERPL: 1.4 {RATIO} (ref 1.1–1.8)
ALP ISO SERPL-ACNC: 152 U/L (ref 38–126)
ALT SERPLBLD-CCNC: 29 U/L (ref 12–78)
ANION GAP SERPL CALC-SCNC: 14.2 MEQ/L (ref 5–15)
AST SERPL QL: 32 U/L (ref 14–36)
BILIRUBIN,TOTAL: 0.4 MG/DL (ref 0.2–1.3)
BUN SERPL-MCNC: 11 MG/DL (ref 7–17)
CALCIUM SPEC-MCNC: 9.3 MG/DL (ref 8.4–10.2)
CHLORIDE SPEC-SCNC: 100 MMOL/L (ref 98–107)
CHOLEST SPEC-SCNC: 184 MG/DL (ref 140–200)
CO2 SERPL-SCNC: 28 MMOL/L (ref 22–30)
CREAT BLD-SCNC: 0.7 MG/DL (ref 0.52–1.04)
ESTIMATED GLOMERULAR FILT RATE: 85 ML/MIN (ref 60–?)
GFR (AFRICAN AMERICAN): 103 ML/MIN (ref 60–?)
GLOBULIN SER CALC-MCNC: 3.2 G/DL (ref 1.3–3.2)
GLUCOSE: 130 MG/DL (ref 74–100)
HBA1C MFR BLD: 6.3 % (ref 4–6)
HCT VFR BLD CALC: 39.1 % (ref 37–47)
HDLC SERPL-MCNC: 59 MG/DL (ref 40–60)
HGB BLD-MCNC: 12.6 G/DL (ref 12.2–16.2)
MCHC RBC-ENTMCNC: 32.1 G/DL (ref 31.8–35.4)
MCV RBC: 87.5 FL (ref 81–99)
MEAN CORPUSCULAR HEMOGLOBIN: 28.1 PG (ref 27–31.2)
PLATELET # BLD: 433 K/MM3 (ref 142–424)
POTASSIUM: 4.2 MMOL/L (ref 3.5–5.1)
PROT SERPL-MCNC: 7.7 G/DL (ref 6.3–8.2)
RBC # BLD AUTO: 4.47 M/MM3 (ref 4.2–5.4)
SODIUM SPEC-SCNC: 138 MMOL/L (ref 136–145)
T4 (THYROXINE): 7.1 UG/DL (ref 5.53–11)
TRIGLYCERIDES: 264 MG/DL (ref 30–150)
TSH SERPL-ACNC: 0.93 UIU/ML (ref 0.47–4.68)
VITAMIN B12: 540 PG/ML (ref 239–931)
WBC # BLD AUTO: 8.2 K/MM3 (ref 4.8–10.8)

## 2023-09-14 PROCEDURE — 80061 LIPID PANEL: CPT

## 2023-09-14 PROCEDURE — 82306 VITAMIN D 25 HYDROXY: CPT

## 2023-09-14 PROCEDURE — 84436 ASSAY OF TOTAL THYROXINE: CPT

## 2023-09-14 PROCEDURE — 82607 VITAMIN B-12: CPT

## 2023-09-14 PROCEDURE — 80053 COMPREHEN METABOLIC PANEL: CPT

## 2023-09-14 PROCEDURE — 83036 HEMOGLOBIN GLYCOSYLATED A1C: CPT

## 2023-09-14 PROCEDURE — 84443 ASSAY THYROID STIM HORMONE: CPT

## 2023-09-14 PROCEDURE — 85025 COMPLETE CBC W/AUTO DIFF WBC: CPT

## 2023-09-22 ENCOUNTER — HOSPITAL ENCOUNTER (OUTPATIENT)
Age: 63
End: 2023-09-22
Payer: MEDICARE

## 2023-09-22 DIAGNOSIS — R42: Primary | ICD-10-CM

## 2023-09-22 PROCEDURE — 93880 EXTRACRANIAL BILAT STUDY: CPT

## 2023-09-25 ENCOUNTER — HOSPITAL ENCOUNTER (OUTPATIENT)
Age: 63
End: 2023-09-25
Payer: MEDICARE

## 2023-09-25 DIAGNOSIS — R42: Primary | ICD-10-CM

## 2023-09-25 PROCEDURE — 93225 XTRNL ECG REC<48 HRS REC: CPT

## 2023-10-02 ENCOUNTER — HOSPITAL ENCOUNTER (OUTPATIENT)
Age: 63
End: 2023-10-02
Payer: MEDICARE

## 2023-10-02 DIAGNOSIS — H93.19: ICD-10-CM

## 2023-10-02 DIAGNOSIS — R53.83: ICD-10-CM

## 2023-10-02 DIAGNOSIS — R42: Primary | ICD-10-CM

## 2023-10-02 DIAGNOSIS — G47.8: ICD-10-CM

## 2023-10-02 PROCEDURE — 70553 MRI BRAIN STEM W/O & W/DYE: CPT

## 2023-10-02 PROCEDURE — 76376 3D RENDER W/INTRP POSTPROCES: CPT

## 2023-10-02 PROCEDURE — 72141 MRI NECK SPINE W/O DYE: CPT

## 2023-10-02 PROCEDURE — A9576 INJ PROHANCE MULTIPACK: HCPCS

## 2023-10-16 ENCOUNTER — HOSPITAL ENCOUNTER (OUTPATIENT)
Age: 63
End: 2023-10-16
Payer: MEDICARE

## 2023-10-16 DIAGNOSIS — R06.02: Primary | ICD-10-CM

## 2023-10-16 DIAGNOSIS — R53.83: ICD-10-CM

## 2023-10-16 PROCEDURE — 87086 URINE CULTURE/COLONY COUNT: CPT

## 2023-10-16 PROCEDURE — 87636 SARSCOV2 & INF A&B AMP PRB: CPT

## 2023-10-24 ENCOUNTER — HOSPITAL ENCOUNTER (OUTPATIENT)
Age: 63
End: 2023-10-24
Payer: MEDICARE

## 2023-10-24 DIAGNOSIS — H93.19: ICD-10-CM

## 2023-10-24 DIAGNOSIS — R53.83: ICD-10-CM

## 2023-10-24 DIAGNOSIS — G47.30: ICD-10-CM

## 2023-10-24 DIAGNOSIS — R42: ICD-10-CM

## 2023-10-24 DIAGNOSIS — R40.0: ICD-10-CM

## 2023-10-24 PROCEDURE — G0399 HOME SLEEP TEST/TYPE 3 PORTA: HCPCS

## 2023-10-25 ENCOUNTER — HOSPITAL ENCOUNTER (OUTPATIENT)
Age: 63
End: 2023-10-25
Payer: MEDICARE

## 2023-10-25 DIAGNOSIS — E78.2: Primary | ICD-10-CM

## 2023-10-25 DIAGNOSIS — Z87.891: ICD-10-CM

## 2023-10-25 DIAGNOSIS — R06.09: Primary | ICD-10-CM

## 2023-10-25 LAB
CHOLEST SPEC-SCNC: 203 MG/DL (ref 140–200)
HDLC SERPL-MCNC: 57 MG/DL (ref 40–60)
TRIGLYCERIDES: 221 MG/DL (ref 30–150)

## 2023-10-25 PROCEDURE — 80061 LIPID PANEL: CPT

## 2023-10-27 ENCOUNTER — HOSPITAL ENCOUNTER (OUTPATIENT)
Age: 63
End: 2023-10-27
Payer: MEDICARE

## 2023-10-27 DIAGNOSIS — R53.83: ICD-10-CM

## 2023-10-27 DIAGNOSIS — G47.8: Primary | ICD-10-CM

## 2023-10-27 DIAGNOSIS — H93.19: ICD-10-CM

## 2023-10-27 DIAGNOSIS — R42: ICD-10-CM

## 2023-10-27 PROCEDURE — 70544 MR ANGIOGRAPHY HEAD W/O DYE: CPT

## 2023-10-27 PROCEDURE — 70547 MR ANGIOGRAPHY NECK W/O DYE: CPT

## 2023-11-10 ENCOUNTER — HOSPITAL ENCOUNTER (OUTPATIENT)
Age: 63
End: 2023-11-10
Payer: MEDICARE

## 2023-11-10 DIAGNOSIS — I20.89: ICD-10-CM

## 2023-11-10 DIAGNOSIS — E78.5: ICD-10-CM

## 2023-11-10 DIAGNOSIS — R00.2: ICD-10-CM

## 2023-11-10 DIAGNOSIS — I10: ICD-10-CM

## 2023-11-10 PROCEDURE — 78452 HT MUSCLE IMAGE SPECT MULT: CPT

## 2023-11-10 PROCEDURE — 93018 CV STRESS TEST I&R ONLY: CPT

## 2023-11-10 PROCEDURE — A9502 TC99M TETROFOSMIN: HCPCS

## 2023-11-10 PROCEDURE — 93017 CV STRESS TEST TRACING ONLY: CPT

## 2023-11-17 ENCOUNTER — HOSPITAL ENCOUNTER (OUTPATIENT)
Age: 63
End: 2023-11-17
Payer: MEDICARE

## 2023-11-17 DIAGNOSIS — E78.5: ICD-10-CM

## 2023-11-17 DIAGNOSIS — Z87.891: ICD-10-CM

## 2023-11-17 DIAGNOSIS — R00.2: ICD-10-CM

## 2023-11-17 DIAGNOSIS — I25.118: Primary | ICD-10-CM

## 2023-11-17 DIAGNOSIS — I11.9: ICD-10-CM

## 2023-11-17 PROCEDURE — 93306 TTE W/DOPPLER COMPLETE: CPT

## 2023-11-29 ENCOUNTER — HOSPITAL ENCOUNTER (EMERGENCY)
Age: 63
Discharge: HOME | End: 2023-11-29
Payer: MEDICARE

## 2023-11-29 VITALS — BODY MASS INDEX: 36.6 KG/M2

## 2023-11-29 VITALS
OXYGEN SATURATION: 95 % | DIASTOLIC BLOOD PRESSURE: 66 MMHG | HEART RATE: 92 BPM | TEMPERATURE: 98.06 F | SYSTOLIC BLOOD PRESSURE: 120 MMHG | RESPIRATION RATE: 20 BRPM

## 2023-11-29 VITALS
OXYGEN SATURATION: 95 % | TEMPERATURE: 98 F | DIASTOLIC BLOOD PRESSURE: 66 MMHG | HEART RATE: 92 BPM | SYSTOLIC BLOOD PRESSURE: 120 MMHG | RESPIRATION RATE: 20 BRPM

## 2023-11-29 DIAGNOSIS — I10: ICD-10-CM

## 2023-11-29 DIAGNOSIS — R09.82: ICD-10-CM

## 2023-11-29 DIAGNOSIS — R43.9: ICD-10-CM

## 2023-11-29 DIAGNOSIS — J44.9: ICD-10-CM

## 2023-11-29 DIAGNOSIS — R07.0: ICD-10-CM

## 2023-11-29 DIAGNOSIS — J01.90: Primary | ICD-10-CM

## 2023-11-29 DIAGNOSIS — R09.81: ICD-10-CM

## 2023-11-29 DIAGNOSIS — E03.9: ICD-10-CM

## 2023-11-29 DIAGNOSIS — K21.9: ICD-10-CM

## 2023-11-29 DIAGNOSIS — R51.9: ICD-10-CM

## 2023-11-29 DIAGNOSIS — Z87.891: ICD-10-CM

## 2023-11-29 DIAGNOSIS — E78.5: ICD-10-CM

## 2023-11-29 PROCEDURE — 99214 OFFICE O/P EST MOD 30 MIN: CPT

## 2023-11-29 PROCEDURE — 99212 OFFICE O/P EST SF 10 MIN: CPT

## 2023-11-29 PROCEDURE — 87804 INFLUENZA ASSAY W/OPTIC: CPT

## 2023-11-29 PROCEDURE — G0463 HOSPITAL OUTPT CLINIC VISIT: HCPCS

## 2023-12-07 ENCOUNTER — HOSPITAL ENCOUNTER (OUTPATIENT)
Age: 63
End: 2023-12-07
Payer: MEDICARE

## 2023-12-07 DIAGNOSIS — J44.9: ICD-10-CM

## 2023-12-07 DIAGNOSIS — Z87.891: ICD-10-CM

## 2023-12-07 DIAGNOSIS — R60.9: ICD-10-CM

## 2023-12-07 DIAGNOSIS — R59.9: ICD-10-CM

## 2023-12-07 DIAGNOSIS — Z00.00: Primary | ICD-10-CM

## 2023-12-07 LAB
ALBUMIN LEVEL: 4.1 G/DL (ref 3.5–5)
ALBUMIN/GLOB SERPL: 1.3 {RATIO} (ref 1.1–1.8)
ALP ISO SERPL-ACNC: 126 U/L (ref 38–126)
ALT SERPLBLD-CCNC: 27 U/L (ref 12–78)
ANION GAP SERPL CALC-SCNC: 8.7 MEQ/L (ref 5–15)
AST SERPL QL: 29 U/L (ref 14–36)
BILIRUBIN,TOTAL: 0.2 MG/DL (ref 0.2–1.3)
BUN SERPL-MCNC: 15 MG/DL (ref 7–17)
CALCIUM SPEC-MCNC: 8.8 MG/DL (ref 8.4–10.2)
CHLORIDE SPEC-SCNC: 103 MMOL/L (ref 98–107)
CO2 SERPL-SCNC: 29 MMOL/L (ref 22–30)
CREAT BLD-SCNC: 0.7 MG/DL (ref 0.52–1.04)
ESTIMATED GLOMERULAR FILT RATE: 85 ML/MIN (ref 60–?)
GFR (AFRICAN AMERICAN): 103 ML/MIN (ref 60–?)
GLOBULIN SER CALC-MCNC: 3.1 G/DL (ref 1.3–3.2)
GLUCOSE: 109 MG/DL (ref 74–100)
HCT VFR BLD CALC: 38.7 % (ref 37–47)
HGB BLD-MCNC: 12.9 G/DL (ref 12.2–16.2)
MCHC RBC-ENTMCNC: 33.2 G/DL (ref 31.8–35.4)
MCV RBC: 86.8 FL (ref 81–99)
MEAN CORPUSCULAR HEMOGLOBIN: 28.8 PG (ref 27–31.2)
PLATELET # BLD: 439 K/MM3 (ref 142–424)
POTASSIUM: 4.7 MMOL/L (ref 3.5–5.1)
PROT SERPL-MCNC: 7.2 G/DL (ref 6.3–8.2)
RBC # BLD AUTO: 4.46 M/MM3 (ref 4.2–5.4)
SODIUM SPEC-SCNC: 136 MMOL/L (ref 136–145)
TSH SERPL-ACNC: 1.03 UIU/ML (ref 0.47–4.68)
WBC # BLD AUTO: 10 K/MM3 (ref 4.8–10.8)

## 2023-12-07 PROCEDURE — 85025 COMPLETE CBC W/AUTO DIFF WBC: CPT

## 2023-12-07 PROCEDURE — 36415 COLL VENOUS BLD VENIPUNCTURE: CPT

## 2023-12-07 PROCEDURE — 71046 X-RAY EXAM CHEST 2 VIEWS: CPT

## 2023-12-07 PROCEDURE — 84443 ASSAY THYROID STIM HORMONE: CPT

## 2023-12-07 PROCEDURE — 80053 COMPREHEN METABOLIC PANEL: CPT

## 2023-12-13 ENCOUNTER — HOSPITAL ENCOUNTER (OUTPATIENT)
Age: 63
End: 2023-12-13
Payer: MEDICARE

## 2023-12-13 DIAGNOSIS — R93.89: Primary | ICD-10-CM

## 2023-12-13 PROCEDURE — 71250 CT THORAX DX C-: CPT

## 2023-12-27 ENCOUNTER — HOSPITAL ENCOUNTER (OUTPATIENT)
Age: 63
Discharge: HOME | End: 2023-12-27
Payer: MEDICARE

## 2023-12-27 VITALS
DIASTOLIC BLOOD PRESSURE: 79 MMHG | RESPIRATION RATE: 16 BRPM | OXYGEN SATURATION: 97 % | SYSTOLIC BLOOD PRESSURE: 118 MMHG | HEART RATE: 60 BPM

## 2023-12-27 VITALS
TEMPERATURE: 97.3 F | HEART RATE: 74 BPM | OXYGEN SATURATION: 97 % | RESPIRATION RATE: 18 BRPM | SYSTOLIC BLOOD PRESSURE: 148 MMHG | DIASTOLIC BLOOD PRESSURE: 78 MMHG

## 2023-12-27 VITALS — BODY MASS INDEX: 36.6 KG/M2

## 2023-12-27 VITALS
HEART RATE: 59 BPM | SYSTOLIC BLOOD PRESSURE: 135 MMHG | OXYGEN SATURATION: 97 % | DIASTOLIC BLOOD PRESSURE: 86 MMHG | RESPIRATION RATE: 18 BRPM

## 2023-12-27 VITALS
HEART RATE: 59 BPM | OXYGEN SATURATION: 97 % | DIASTOLIC BLOOD PRESSURE: 95 MMHG | SYSTOLIC BLOOD PRESSURE: 142 MMHG | RESPIRATION RATE: 18 BRPM

## 2023-12-27 VITALS
SYSTOLIC BLOOD PRESSURE: 118 MMHG | HEART RATE: 63 BPM | DIASTOLIC BLOOD PRESSURE: 73 MMHG | RESPIRATION RATE: 18 BRPM | OXYGEN SATURATION: 98 %

## 2023-12-27 DIAGNOSIS — E78.5: Primary | ICD-10-CM

## 2023-12-27 DIAGNOSIS — R94.39: ICD-10-CM

## 2023-12-27 DIAGNOSIS — I20.89: ICD-10-CM

## 2023-12-27 DIAGNOSIS — I10: ICD-10-CM

## 2023-12-27 DIAGNOSIS — R42: ICD-10-CM

## 2023-12-27 DIAGNOSIS — R94.31: ICD-10-CM

## 2023-12-27 DIAGNOSIS — R00.2: ICD-10-CM

## 2023-12-27 PROCEDURE — 75574 CT ANGIO HRT W/3D IMAGE: CPT

## 2023-12-27 PROCEDURE — 75571 CT HRT W/O DYE W/CA TEST: CPT

## 2024-01-05 ENCOUNTER — HOSPITAL ENCOUNTER (OUTPATIENT)
Age: 64
End: 2024-01-05
Payer: MEDICARE

## 2024-01-05 DIAGNOSIS — Z79.899: ICD-10-CM

## 2024-01-05 DIAGNOSIS — E78.5: ICD-10-CM

## 2024-01-05 DIAGNOSIS — R73.09: Primary | ICD-10-CM

## 2024-01-05 DIAGNOSIS — R53.83: ICD-10-CM

## 2024-01-05 LAB
CHOLEST SPEC-SCNC: 189 MG/DL (ref 140–200)
FT4I SERPL CALC-MCNC: 2.2 UG/DL (ref 5.93–13.13)
HBA1C MFR BLD: 6.1 % (ref 4–6)
HDLC SERPL-MCNC: 84 MG/DL (ref 40–60)
T3RU NFR SERPL: 31 % (ref 23.5–40.5)
T4 (THYROXINE): 7.1 UG/DL (ref 5.53–11)
TRIGLYCERIDES: 140 MG/DL (ref 30–150)
TSH SERPL-ACNC: 1.11 UIU/ML (ref 0.47–4.68)

## 2024-01-05 PROCEDURE — 84436 ASSAY OF TOTAL THYROXINE: CPT

## 2024-01-05 PROCEDURE — 84443 ASSAY THYROID STIM HORMONE: CPT

## 2024-01-05 PROCEDURE — 80061 LIPID PANEL: CPT

## 2024-01-05 PROCEDURE — 80307 DRUG TEST PRSMV CHEM ANLYZR: CPT

## 2024-01-05 PROCEDURE — 83036 HEMOGLOBIN GLYCOSYLATED A1C: CPT

## 2024-01-05 PROCEDURE — 84479 ASSAY OF THYROID (T3 OR T4): CPT

## 2024-01-22 ENCOUNTER — HOSPITAL ENCOUNTER (OUTPATIENT)
Age: 64
Discharge: HOME | End: 2024-01-22
Payer: MEDICARE

## 2024-01-22 VITALS
OXYGEN SATURATION: 96 % | DIASTOLIC BLOOD PRESSURE: 72 MMHG | RESPIRATION RATE: 18 BRPM | SYSTOLIC BLOOD PRESSURE: 144 MMHG | HEART RATE: 94 BPM

## 2024-01-22 VITALS — BODY MASS INDEX: 38.2 KG/M2

## 2024-01-22 DIAGNOSIS — M48.062: ICD-10-CM

## 2024-01-22 DIAGNOSIS — M51.16: Primary | ICD-10-CM

## 2024-01-22 PROCEDURE — 99212 OFFICE O/P EST SF 10 MIN: CPT

## 2024-01-22 PROCEDURE — G0463 HOSPITAL OUTPT CLINIC VISIT: HCPCS

## 2024-01-30 ENCOUNTER — HOSPITAL ENCOUNTER (EMERGENCY)
Age: 64
Discharge: HOME | End: 2024-01-30
Payer: MEDICARE

## 2024-01-30 VITALS
HEART RATE: 85 BPM | OXYGEN SATURATION: 96 % | SYSTOLIC BLOOD PRESSURE: 155 MMHG | TEMPERATURE: 98 F | RESPIRATION RATE: 21 BRPM | DIASTOLIC BLOOD PRESSURE: 74 MMHG

## 2024-01-30 VITALS
DIASTOLIC BLOOD PRESSURE: 74 MMHG | SYSTOLIC BLOOD PRESSURE: 155 MMHG | HEART RATE: 85 BPM | TEMPERATURE: 98.06 F | OXYGEN SATURATION: 96 % | RESPIRATION RATE: 21 BRPM

## 2024-01-30 VITALS — BODY MASS INDEX: 38.2 KG/M2

## 2024-01-30 DIAGNOSIS — J44.9: ICD-10-CM

## 2024-01-30 DIAGNOSIS — I10: ICD-10-CM

## 2024-01-30 DIAGNOSIS — Z87.891: ICD-10-CM

## 2024-01-30 DIAGNOSIS — M54.42: Primary | ICD-10-CM

## 2024-01-30 DIAGNOSIS — E78.5: ICD-10-CM

## 2024-01-30 DIAGNOSIS — E03.9: ICD-10-CM

## 2024-01-30 PROCEDURE — 96372 THER/PROPH/DIAG INJ SC/IM: CPT

## 2024-01-30 PROCEDURE — 99212 OFFICE O/P EST SF 10 MIN: CPT

## 2024-01-30 PROCEDURE — G0463 HOSPITAL OUTPT CLINIC VISIT: HCPCS

## 2024-01-30 PROCEDURE — 99214 OFFICE O/P EST MOD 30 MIN: CPT

## 2024-01-31 ENCOUNTER — HOSPITAL ENCOUNTER (OUTPATIENT)
Age: 64
Discharge: HOME | End: 2024-01-31
Payer: MEDICARE

## 2024-01-31 VITALS
OXYGEN SATURATION: 95 % | RESPIRATION RATE: 18 BRPM | HEART RATE: 101 BPM | DIASTOLIC BLOOD PRESSURE: 65 MMHG | SYSTOLIC BLOOD PRESSURE: 151 MMHG

## 2024-01-31 VITALS — BODY MASS INDEX: 38.2 KG/M2

## 2024-01-31 DIAGNOSIS — M51.16: Primary | ICD-10-CM

## 2024-01-31 DIAGNOSIS — M48.062: ICD-10-CM

## 2024-01-31 DIAGNOSIS — M46.1: ICD-10-CM

## 2024-01-31 PROCEDURE — G0463 HOSPITAL OUTPT CLINIC VISIT: HCPCS

## 2024-01-31 PROCEDURE — 99212 OFFICE O/P EST SF 10 MIN: CPT

## 2024-02-14 ENCOUNTER — HOSPITAL ENCOUNTER (OUTPATIENT)
Age: 64
End: 2024-02-14
Payer: MEDICARE

## 2024-02-14 VITALS
DIASTOLIC BLOOD PRESSURE: 83 MMHG | SYSTOLIC BLOOD PRESSURE: 141 MMHG | RESPIRATION RATE: 18 BRPM | OXYGEN SATURATION: 94 % | HEART RATE: 91 BPM

## 2024-02-14 VITALS — BODY MASS INDEX: 38.2 KG/M2

## 2024-02-14 DIAGNOSIS — M51.16: Primary | ICD-10-CM

## 2024-02-14 DIAGNOSIS — M46.1: ICD-10-CM

## 2024-02-14 DIAGNOSIS — M48.062: ICD-10-CM

## 2024-02-14 PROCEDURE — 72110 X-RAY EXAM L-2 SPINE 4/>VWS: CPT

## 2024-02-14 PROCEDURE — 99212 OFFICE O/P EST SF 10 MIN: CPT

## 2024-02-14 PROCEDURE — G0463 HOSPITAL OUTPT CLINIC VISIT: HCPCS

## 2024-03-08 ENCOUNTER — HOSPITAL ENCOUNTER (OUTPATIENT)
Dept: HOSPITAL 22 - RAD | Age: 64
End: 2024-03-08
Payer: MEDICARE

## 2024-03-08 DIAGNOSIS — M54.50: Primary | ICD-10-CM

## 2024-03-08 PROCEDURE — 76376 3D RENDER W/INTRP POSTPROCES: CPT

## 2024-03-08 PROCEDURE — 72148 MRI LUMBAR SPINE W/O DYE: CPT

## 2024-03-11 ENCOUNTER — HOSPITAL ENCOUNTER (OUTPATIENT)
Dept: HOSPITAL 22 - LAB.DROPOF | Age: 64
End: 2024-03-11
Payer: MEDICARE

## 2024-03-11 DIAGNOSIS — E78.5: ICD-10-CM

## 2024-03-11 DIAGNOSIS — Z79.899: ICD-10-CM

## 2024-03-11 DIAGNOSIS — R79.89: Primary | ICD-10-CM

## 2024-03-11 LAB
CHOLEST SPEC-SCNC: 171 MG/DL (ref 140–200)
HBA1C MFR BLD: 8.3 % (ref 4–6)
HCT VFR BLD CALC: 37.8 % (ref 37–47)
HDLC SERPL-MCNC: 57 MG/DL (ref 40–60)
HGB BLD-MCNC: 12 G/DL (ref 12.2–16.2)
MCHC RBC-ENTMCNC: 31.7 G/DL (ref 31.8–35.4)
MCV RBC: 90.8 FL (ref 81–99)
MEAN CORPUSCULAR HEMOGLOBIN: 28.7 PG (ref 27–31.2)
PLATELET # BLD: 353 K/MM3 (ref 142–424)
RBC # BLD AUTO: 4.16 M/MM3 (ref 4.2–5.4)
TRIGLYCERIDES: 223 MG/DL (ref 30–150)
WBC # BLD AUTO: 8.4 K/MM3 (ref 4.8–10.8)

## 2024-03-11 PROCEDURE — 80061 LIPID PANEL: CPT

## 2024-03-11 PROCEDURE — 83036 HEMOGLOBIN GLYCOSYLATED A1C: CPT

## 2024-03-11 PROCEDURE — 85025 COMPLETE CBC W/AUTO DIFF WBC: CPT

## 2024-03-14 ENCOUNTER — HOSPITAL ENCOUNTER (OUTPATIENT)
Dept: HOSPITAL 22 - SC.PAIN | Age: 64
End: 2024-03-14
Payer: MEDICARE

## 2024-03-14 VITALS
OXYGEN SATURATION: 94 % | DIASTOLIC BLOOD PRESSURE: 73 MMHG | SYSTOLIC BLOOD PRESSURE: 131 MMHG | RESPIRATION RATE: 18 BRPM | HEART RATE: 87 BPM

## 2024-03-14 VITALS — BODY MASS INDEX: 41.5 KG/M2

## 2024-03-14 DIAGNOSIS — M51.16: Primary | ICD-10-CM

## 2024-03-14 DIAGNOSIS — M48.062: ICD-10-CM

## 2024-03-14 DIAGNOSIS — M43.26: ICD-10-CM

## 2024-03-14 PROCEDURE — 99212 OFFICE O/P EST SF 10 MIN: CPT

## 2024-03-14 PROCEDURE — G0463 HOSPITAL OUTPT CLINIC VISIT: HCPCS

## 2024-03-26 ENCOUNTER — HOSPITAL ENCOUNTER (OUTPATIENT)
Dept: HOSPITAL 22 - SL | Age: 64
End: 2024-03-26
Payer: MEDICARE

## 2024-03-26 DIAGNOSIS — R53.83: ICD-10-CM

## 2024-03-26 DIAGNOSIS — J44.9: ICD-10-CM

## 2024-03-26 DIAGNOSIS — G47.36: ICD-10-CM

## 2024-03-26 DIAGNOSIS — G47.61: ICD-10-CM

## 2024-03-26 DIAGNOSIS — G47.30: Primary | ICD-10-CM

## 2024-03-26 DIAGNOSIS — Z91.89: ICD-10-CM

## 2024-03-26 DIAGNOSIS — J98.4: ICD-10-CM

## 2024-03-26 PROCEDURE — 95810 POLYSOM 6/> YRS 4/> PARAM: CPT

## 2024-04-03 ENCOUNTER — HOSPITAL ENCOUNTER (OUTPATIENT)
Dept: HOSPITAL 22 - LAB | Age: 64
End: 2024-04-03
Payer: MEDICARE

## 2024-04-03 DIAGNOSIS — R94.4: Primary | ICD-10-CM

## 2024-04-03 LAB
BUN SERPL-MCNC: 8 MG/DL (ref 7–17)
CREATININE,SERUM: 0.5 MG/DL (ref 0.52–1.04)
ESTIMATED GLOMERULAR FILT RATE: 125 ML/MIN (ref 60–?)
GFR (AFRICAN AMERICAN): 152 ML/MIN (ref 60–?)

## 2024-04-03 PROCEDURE — 36415 COLL VENOUS BLD VENIPUNCTURE: CPT

## 2024-04-03 PROCEDURE — 84520 ASSAY OF UREA NITROGEN: CPT

## 2024-04-03 PROCEDURE — 82565 ASSAY OF CREATININE: CPT

## 2024-04-08 ENCOUNTER — HOSPITAL ENCOUNTER (OUTPATIENT)
Dept: HOSPITAL 22 - LAB.DROPOF | Age: 64
End: 2024-04-08
Payer: MEDICARE

## 2024-04-08 DIAGNOSIS — E11.9: Primary | ICD-10-CM

## 2024-04-08 PROCEDURE — 82043 UR ALBUMIN QUANTITATIVE: CPT

## 2024-04-08 PROCEDURE — 82570 ASSAY OF URINE CREATININE: CPT

## 2024-04-16 ENCOUNTER — HOSPITAL ENCOUNTER (OUTPATIENT)
Dept: HOSPITAL 22 - RAD | Age: 64
End: 2024-04-16
Payer: MEDICARE

## 2024-04-16 DIAGNOSIS — F17.210: Primary | ICD-10-CM

## 2024-04-16 DIAGNOSIS — Z12.2: ICD-10-CM

## 2024-04-16 PROCEDURE — 71271 CT THORAX LUNG CANCER SCR C-: CPT

## 2024-04-19 ENCOUNTER — HOSPITAL ENCOUNTER (OUTPATIENT)
Dept: HOSPITAL 22 - RAD | Age: 64
End: 2024-04-19
Payer: MEDICARE

## 2024-04-19 DIAGNOSIS — Z12.31: Primary | ICD-10-CM

## 2024-04-19 PROCEDURE — 77067 SCR MAMMO BI INCL CAD: CPT

## 2024-04-19 PROCEDURE — 77063 BREAST TOMOSYNTHESIS BI: CPT

## 2024-06-19 ENCOUNTER — HOSPITAL ENCOUNTER (OUTPATIENT)
Age: 64
Discharge: HOME | End: 2024-06-19
Payer: MEDICARE

## 2024-06-19 VITALS — HEART RATE: 81 BPM

## 2024-06-19 DIAGNOSIS — R06.09: Primary | ICD-10-CM

## 2024-06-19 DIAGNOSIS — J84.9: ICD-10-CM

## 2024-06-19 DIAGNOSIS — Z87.891: ICD-10-CM

## 2024-06-19 LAB
CRP SERPL HS-MCNC: 13 MG/L (ref 0–4)
URATE SERPL-SCNC: 5.6 MG/DL (ref 2.5–6.2)

## 2024-06-19 PROCEDURE — 85651 RBC SED RATE NONAUTOMATED: CPT

## 2024-06-19 PROCEDURE — 94729 DIFFUSING CAPACITY: CPT

## 2024-06-19 PROCEDURE — 86235 NUCLEAR ANTIGEN ANTIBODY: CPT

## 2024-06-19 PROCEDURE — 86225 DNA ANTIBODY NATIVE: CPT

## 2024-06-19 PROCEDURE — 86140 C-REACTIVE PROTEIN: CPT

## 2024-06-19 PROCEDURE — 94640 AIRWAY INHALATION TREATMENT: CPT

## 2024-06-19 PROCEDURE — 36415 COLL VENOUS BLD VENIPUNCTURE: CPT

## 2024-06-19 PROCEDURE — 84550 ASSAY OF BLOOD/URIC ACID: CPT

## 2024-06-19 PROCEDURE — 86431 RHEUMATOID FACTOR QUANT: CPT

## 2024-06-19 PROCEDURE — 86038 ANTINUCLEAR ANTIBODIES: CPT

## 2024-06-19 PROCEDURE — 94618 PULMONARY STRESS TESTING: CPT

## 2024-06-19 PROCEDURE — 94726 PLETHYSMOGRAPHY LUNG VOLUMES: CPT

## 2024-06-19 PROCEDURE — 94060 EVALUATION OF WHEEZING: CPT

## 2024-06-21 LAB — RA LATEX TURBID.: <10 IU/ML (ref ?–14)

## 2024-07-22 ENCOUNTER — HOSPITAL ENCOUNTER (OUTPATIENT)
Age: 64
Discharge: HOME | End: 2024-07-22
Payer: MEDICARE

## 2024-07-22 VITALS — BODY MASS INDEX: 40.7 KG/M2

## 2024-07-22 VITALS
SYSTOLIC BLOOD PRESSURE: 132 MMHG | DIASTOLIC BLOOD PRESSURE: 74 MMHG | RESPIRATION RATE: 16 BRPM | HEART RATE: 71 BPM | OXYGEN SATURATION: 96 %

## 2024-07-22 DIAGNOSIS — M51.36: ICD-10-CM

## 2024-07-22 DIAGNOSIS — M54.16: Primary | ICD-10-CM

## 2024-07-22 DIAGNOSIS — G89.4: ICD-10-CM

## 2024-07-22 PROCEDURE — G0463 HOSPITAL OUTPT CLINIC VISIT: HCPCS

## 2024-07-22 PROCEDURE — 99212 OFFICE O/P EST SF 10 MIN: CPT

## 2024-07-29 ENCOUNTER — HOSPITAL ENCOUNTER (OUTPATIENT)
Dept: HOSPITAL 22 - RT | Age: 64
Discharge: HOME | End: 2024-07-29
Payer: MEDICARE

## 2024-07-29 DIAGNOSIS — R00.2: Primary | ICD-10-CM

## 2024-07-29 DIAGNOSIS — I10: ICD-10-CM

## 2024-07-29 DIAGNOSIS — Z87.891: ICD-10-CM

## 2024-07-29 DIAGNOSIS — R73.03: ICD-10-CM

## 2024-07-29 LAB
CHOLEST SPEC-SCNC: 159 MG/DL (ref 140–200)
HBA1C MFR BLD: 7.1 % (ref 4–6)
HDLC SERPL-MCNC: 45 MG/DL (ref 40–60)
TRIGLYCERIDES: 309 MG/DL (ref 30–150)

## 2024-07-29 PROCEDURE — 80061 LIPID PANEL: CPT

## 2024-07-29 PROCEDURE — 93227 XTRNL ECG REC<48 HR R&I: CPT

## 2024-07-29 PROCEDURE — 93225 XTRNL ECG REC<48 HRS REC: CPT

## 2024-07-29 PROCEDURE — 83036 HEMOGLOBIN GLYCOSYLATED A1C: CPT

## 2024-07-31 ENCOUNTER — HOSPITAL ENCOUNTER (OUTPATIENT)
Dept: HOSPITAL 22 - RT | Age: 64
Discharge: HOME | End: 2024-07-31
Payer: MEDICARE

## 2024-07-31 DIAGNOSIS — R00.2: Primary | ICD-10-CM

## 2024-07-31 PROCEDURE — 93270 REMOTE 30 DAY ECG REV/REPORT: CPT

## 2024-08-21 ENCOUNTER — HOSPITAL ENCOUNTER (OUTPATIENT)
Dept: HOSPITAL 22 - SC.PAIN | Age: 64
Discharge: HOME | End: 2024-08-21
Payer: MEDICARE

## 2024-08-21 VITALS
OXYGEN SATURATION: 94 % | HEART RATE: 64 BPM | SYSTOLIC BLOOD PRESSURE: 159 MMHG | RESPIRATION RATE: 16 BRPM | DIASTOLIC BLOOD PRESSURE: 63 MMHG

## 2024-08-21 VITALS — BODY MASS INDEX: 38.2 KG/M2

## 2024-08-21 DIAGNOSIS — Z87.891: ICD-10-CM

## 2024-08-21 DIAGNOSIS — M54.16: ICD-10-CM

## 2024-08-21 DIAGNOSIS — G89.4: Primary | ICD-10-CM

## 2024-08-21 DIAGNOSIS — Z79.899: ICD-10-CM

## 2024-08-21 PROCEDURE — 99212 OFFICE O/P EST SF 10 MIN: CPT

## 2024-08-21 PROCEDURE — G0463 HOSPITAL OUTPT CLINIC VISIT: HCPCS

## 2024-08-30 ENCOUNTER — HOSPITAL ENCOUNTER (OUTPATIENT)
Dept: HOSPITAL 22 - PT | Age: 64
Discharge: HOME | End: 2024-08-30
Payer: MEDICARE

## 2024-08-30 DIAGNOSIS — M54.50: Primary | ICD-10-CM

## 2024-08-30 DIAGNOSIS — M79.662: ICD-10-CM

## 2024-08-30 DIAGNOSIS — M79.661: ICD-10-CM

## 2024-08-30 PROCEDURE — 97163 PT EVAL HIGH COMPLEX 45 MIN: CPT

## 2024-09-23 ENCOUNTER — HOSPITAL ENCOUNTER (OUTPATIENT)
Dept: HOSPITAL 22 - RAD | Age: 64
Discharge: HOME | End: 2024-09-23
Payer: MEDICARE

## 2024-09-23 DIAGNOSIS — J84.9: Primary | ICD-10-CM

## 2024-09-23 PROCEDURE — 71250 CT THORAX DX C-: CPT

## 2025-07-14 ENCOUNTER — HOSPITAL ENCOUNTER (OUTPATIENT)
Dept: HOSPITAL 22 - RAD | Age: 65
Discharge: HOME | End: 2025-07-14
Payer: MEDICARE

## 2025-07-14 DIAGNOSIS — R92.313: ICD-10-CM

## 2025-07-14 DIAGNOSIS — J84.89: ICD-10-CM

## 2025-07-14 DIAGNOSIS — Z12.31: Primary | ICD-10-CM

## 2025-07-14 DIAGNOSIS — R91.1: ICD-10-CM

## 2025-07-14 DIAGNOSIS — Z87.891: ICD-10-CM

## 2025-07-14 PROCEDURE — 71250 CT THORAX DX C-: CPT

## 2025-07-14 PROCEDURE — 77067 SCR MAMMO BI INCL CAD: CPT

## 2025-07-14 PROCEDURE — 77063 BREAST TOMOSYNTHESIS BI: CPT
